# Patient Record
Sex: MALE | Race: WHITE | Employment: UNEMPLOYED | URBAN - METROPOLITAN AREA
[De-identification: names, ages, dates, MRNs, and addresses within clinical notes are randomized per-mention and may not be internally consistent; named-entity substitution may affect disease eponyms.]

---

## 2020-05-01 ENCOUNTER — APPOINTMENT (OUTPATIENT)
Dept: GENERAL RADIOLOGY | Age: 25
End: 2020-05-01
Payer: MEDICAID

## 2020-05-01 ENCOUNTER — HOSPITAL ENCOUNTER (EMERGENCY)
Age: 25
Discharge: HOME | End: 2020-05-01
Payer: MEDICAID

## 2020-05-01 VITALS
WEIGHT: 275 LBS | SYSTOLIC BLOOD PRESSURE: 134 MMHG | HEART RATE: 98 BPM | TEMPERATURE: 99.7 F | DIASTOLIC BLOOD PRESSURE: 88 MMHG | BODY MASS INDEX: 38.5 KG/M2 | HEIGHT: 71 IN | RESPIRATION RATE: 20 BRPM | OXYGEN SATURATION: 99 %

## 2020-05-01 LAB
A/G RATIO: 1.1 (ref 1.1–2.2)
ALBUMIN SERPL-MCNC: 4.4 G/DL (ref 3.4–5)
ALP BLD-CCNC: 38 U/L (ref 40–129)
ALT SERPL-CCNC: 92 U/L (ref 10–40)
ANION GAP SERPL CALCULATED.3IONS-SCNC: 18 MMOL/L (ref 3–16)
AST SERPL-CCNC: 44 U/L (ref 15–37)
BASOPHILS ABSOLUTE: 0.1 K/UL (ref 0–0.2)
BASOPHILS RELATIVE PERCENT: 0.8 %
BILIRUB SERPL-MCNC: 0.3 MG/DL (ref 0–1)
BILIRUBIN URINE: NEGATIVE
BLOOD, URINE: NEGATIVE
BUN BLDV-MCNC: 9 MG/DL (ref 7–20)
CALCIUM SERPL-MCNC: 9.7 MG/DL (ref 8.3–10.6)
CHLORIDE BLD-SCNC: 100 MMOL/L (ref 99–110)
CLARITY: CLEAR
CO2: 22 MMOL/L (ref 21–32)
COLOR: YELLOW
CREAT SERPL-MCNC: 0.7 MG/DL (ref 0.9–1.3)
EOSINOPHILS ABSOLUTE: 0.2 K/UL (ref 0–0.6)
EOSINOPHILS RELATIVE PERCENT: 1.3 %
GFR AFRICAN AMERICAN: >60
GFR NON-AFRICAN AMERICAN: >60
GLOBULIN: 4.1 G/DL
GLUCOSE BLD-MCNC: 111 MG/DL (ref 70–99)
GLUCOSE URINE: NEGATIVE MG/DL
HCT VFR BLD CALC: 46.4 % (ref 40.5–52.5)
HEMOGLOBIN: 15.5 G/DL (ref 13.5–17.5)
KETONES, URINE: NEGATIVE MG/DL
LEUKOCYTE ESTERASE, URINE: NEGATIVE
LYMPHOCYTES ABSOLUTE: 2 K/UL (ref 1–5.1)
LYMPHOCYTES RELATIVE PERCENT: 12.7 %
MCH RBC QN AUTO: 28.7 PG (ref 26–34)
MCHC RBC AUTO-ENTMCNC: 33.5 G/DL (ref 31–36)
MCV RBC AUTO: 85.6 FL (ref 80–100)
MICROSCOPIC EXAMINATION: NORMAL
MONOCYTES ABSOLUTE: 1 K/UL (ref 0–1.3)
MONOCYTES RELATIVE PERCENT: 6.2 %
NEUTROPHILS ABSOLUTE: 12.4 K/UL (ref 1.7–7.7)
NEUTROPHILS RELATIVE PERCENT: 79 %
NITRITE, URINE: NEGATIVE
PDW BLD-RTO: 14.8 % (ref 12.4–15.4)
PH UA: 6 (ref 5–8)
PLATELET # BLD: 345 K/UL (ref 135–450)
PMV BLD AUTO: 7.8 FL (ref 5–10.5)
POTASSIUM SERPL-SCNC: 3.9 MMOL/L (ref 3.5–5.1)
PROTEIN UA: NEGATIVE MG/DL
RBC # BLD: 5.42 M/UL (ref 4.2–5.9)
SODIUM BLD-SCNC: 140 MMOL/L (ref 136–145)
SPECIFIC GRAVITY UA: <=1.005 (ref 1–1.03)
TOTAL PROTEIN: 8.5 G/DL (ref 6.4–8.2)
URINE TYPE: NORMAL
UROBILINOGEN, URINE: 0.2 E.U./DL
WBC # BLD: 15.8 K/UL (ref 4–11)

## 2020-05-01 PROCEDURE — 85025 COMPLETE CBC W/AUTO DIFF WBC: CPT

## 2020-05-01 PROCEDURE — 71046 X-RAY EXAM CHEST 2 VIEWS: CPT

## 2020-05-01 PROCEDURE — 99284 EMERGENCY DEPT VISIT MOD MDM: CPT

## 2020-05-01 PROCEDURE — 80053 COMPREHEN METABOLIC PANEL: CPT

## 2020-05-01 PROCEDURE — 36415 COLL VENOUS BLD VENIPUNCTURE: CPT

## 2020-05-01 PROCEDURE — 81003 URINALYSIS AUTO W/O SCOPE: CPT

## 2020-05-01 RX ORDER — ONDANSETRON 4 MG/1
4 TABLET, FILM COATED ORAL EVERY 8 HOURS PRN
Qty: 20 TABLET | Refills: 0 | Status: SHIPPED | OUTPATIENT
Start: 2020-05-01 | End: 2020-08-28

## 2020-05-01 RX ORDER — MECLIZINE HYDROCHLORIDE 25 MG/1
25 TABLET ORAL 3 TIMES DAILY PRN
Qty: 15 TABLET | Refills: 0 | Status: SHIPPED | OUTPATIENT
Start: 2020-05-01 | End: 2020-05-11

## 2020-05-01 NOTE — ED NOTES
Orthostatic VS as follows. Lying BP: 125/74, P 108, R 18. Sitting /81, P 103, R 18. Standing /80, P 97, R 20. Pt states + dizziness during each posture change.  Pt to CT via [facility] without incident or c/o's     Galdino Drake RN  05/01/20 5620

## 2020-08-19 ENCOUNTER — HOSPITAL ENCOUNTER (OUTPATIENT)
Dept: ULTRASOUND IMAGING | Age: 25
Discharge: HOME | End: 2020-08-19
Payer: COMMERCIAL

## 2020-08-19 PROCEDURE — 76705 ECHO EXAM OF ABDOMEN: CPT

## 2020-08-28 ENCOUNTER — APPOINTMENT (OUTPATIENT)
Dept: GENERAL RADIOLOGY | Age: 25
End: 2020-08-28
Payer: COMMERCIAL

## 2020-08-28 ENCOUNTER — HOSPITAL ENCOUNTER (EMERGENCY)
Age: 25
Discharge: HOME | End: 2020-08-28
Payer: COMMERCIAL

## 2020-08-28 VITALS
TEMPERATURE: 97.6 F | SYSTOLIC BLOOD PRESSURE: 135 MMHG | DIASTOLIC BLOOD PRESSURE: 78 MMHG | RESPIRATION RATE: 16 BRPM | HEART RATE: 77 BPM | OXYGEN SATURATION: 99 %

## 2020-08-28 LAB
A/G RATIO: 1.1 (ref 1.1–2.2)
ALBUMIN SERPL-MCNC: 4.6 G/DL (ref 3.4–5)
ALP BLD-CCNC: 38 U/L (ref 40–129)
ALT SERPL-CCNC: 98 U/L (ref 10–40)
ANION GAP SERPL CALCULATED.3IONS-SCNC: 12 MMOL/L (ref 3–16)
AST SERPL-CCNC: 34 U/L (ref 15–37)
BASOPHILS ABSOLUTE: 0.1 K/UL (ref 0–0.2)
BASOPHILS RELATIVE PERCENT: 0.5 %
BILIRUB SERPL-MCNC: 0.7 MG/DL (ref 0–1)
BUN BLDV-MCNC: 9 MG/DL (ref 7–20)
CALCIUM SERPL-MCNC: 9.9 MG/DL (ref 8.3–10.6)
CHLORIDE BLD-SCNC: 98 MMOL/L (ref 99–110)
CO2: 24 MMOL/L (ref 21–32)
CREAT SERPL-MCNC: 0.7 MG/DL (ref 0.9–1.3)
D DIMER: 218 NG/ML DDU (ref 0–229)
EKG ATRIAL RATE: 76 BPM
EKG DIAGNOSIS: NORMAL
EKG P AXIS: 43 DEGREES
EKG P-R INTERVAL: 138 MS
EKG Q-T INTERVAL: 392 MS
EKG QRS DURATION: 102 MS
EKG QTC CALCULATION (BAZETT): 441 MS
EKG R AXIS: 22 DEGREES
EKG T AXIS: 61 DEGREES
EKG VENTRICULAR RATE: 76 BPM
EOSINOPHILS ABSOLUTE: 0.2 K/UL (ref 0–0.6)
EOSINOPHILS RELATIVE PERCENT: 1.2 %
GFR AFRICAN AMERICAN: >60
GFR NON-AFRICAN AMERICAN: >60
GLOBULIN: 4.1 G/DL
GLUCOSE BLD-MCNC: 97 MG/DL (ref 70–99)
HCT VFR BLD CALC: 47.1 % (ref 40.5–52.5)
HEMOGLOBIN: 15.7 G/DL (ref 13.5–17.5)
LYMPHOCYTES ABSOLUTE: 3.4 K/UL (ref 1–5.1)
LYMPHOCYTES RELATIVE PERCENT: 21.1 %
MCH RBC QN AUTO: 28.7 PG (ref 26–34)
MCHC RBC AUTO-ENTMCNC: 33.4 G/DL (ref 31–36)
MCV RBC AUTO: 85.8 FL (ref 80–100)
MONOCYTES ABSOLUTE: 1.2 K/UL (ref 0–1.3)
MONOCYTES RELATIVE PERCENT: 7.3 %
NEUTROPHILS ABSOLUTE: 11.4 K/UL (ref 1.7–7.7)
NEUTROPHILS RELATIVE PERCENT: 69.9 %
PDW BLD-RTO: 15 % (ref 12.4–15.4)
PLATELET # BLD: 336 K/UL (ref 135–450)
PMV BLD AUTO: 8.1 FL (ref 5–10.5)
POTASSIUM REFLEX MAGNESIUM: 3.7 MMOL/L (ref 3.5–5.1)
PRO-BNP: 10 PG/ML (ref 0–124)
RBC # BLD: 5.49 M/UL (ref 4.2–5.9)
SODIUM BLD-SCNC: 134 MMOL/L (ref 136–145)
TOTAL PROTEIN: 8.7 G/DL (ref 6.4–8.2)
TROPONIN: <0.01 NG/ML
TROPONIN: <0.01 NG/ML
WBC # BLD: 16.3 K/UL (ref 4–11)

## 2020-08-28 PROCEDURE — 80053 COMPREHEN METABOLIC PANEL: CPT

## 2020-08-28 PROCEDURE — 6360000002 HC RX W HCPCS: Performed by: PHYSICIAN ASSISTANT

## 2020-08-28 PROCEDURE — 93010 ELECTROCARDIOGRAM REPORT: CPT | Performed by: INTERNAL MEDICINE

## 2020-08-28 PROCEDURE — 83880 ASSAY OF NATRIURETIC PEPTIDE: CPT

## 2020-08-28 PROCEDURE — 6370000000 HC RX 637 (ALT 250 FOR IP): Performed by: PHYSICIAN ASSISTANT

## 2020-08-28 PROCEDURE — 85379 FIBRIN DEGRADATION QUANT: CPT

## 2020-08-28 PROCEDURE — 71046 X-RAY EXAM CHEST 2 VIEWS: CPT

## 2020-08-28 PROCEDURE — 96374 THER/PROPH/DIAG INJ IV PUSH: CPT

## 2020-08-28 PROCEDURE — 99285 EMERGENCY DEPT VISIT HI MDM: CPT

## 2020-08-28 PROCEDURE — U0003 INFECTIOUS AGENT DETECTION BY NUCLEIC ACID (DNA OR RNA); SEVERE ACUTE RESPIRATORY SYNDROME CORONAVIRUS 2 (SARS-COV-2) (CORONAVIRUS DISEASE [COVID-19]), AMPLIFIED PROBE TECHNIQUE, MAKING USE OF HIGH THROUGHPUT TECHNOLOGIES AS DESCRIBED BY CMS-2020-01-R: HCPCS

## 2020-08-28 PROCEDURE — 84484 ASSAY OF TROPONIN QUANT: CPT

## 2020-08-28 PROCEDURE — 85025 COMPLETE CBC W/AUTO DIFF WBC: CPT

## 2020-08-28 PROCEDURE — 93005 ELECTROCARDIOGRAM TRACING: CPT | Performed by: EMERGENCY MEDICINE

## 2020-08-28 PROCEDURE — 2580000003 HC RX 258: Performed by: PHYSICIAN ASSISTANT

## 2020-08-28 RX ORDER — ONDANSETRON 2 MG/ML
4 INJECTION INTRAMUSCULAR; INTRAVENOUS ONCE
Status: COMPLETED | OUTPATIENT
Start: 2020-08-28 | End: 2020-08-28

## 2020-08-28 RX ORDER — MECLIZINE HCL 12.5 MG/1
12.5 TABLET ORAL 3 TIMES DAILY PRN
COMMUNITY
End: 2020-10-15 | Stop reason: ALTCHOICE

## 2020-08-28 RX ORDER — ASPIRIN 325 MG
325 TABLET ORAL ONCE
Status: COMPLETED | OUTPATIENT
Start: 2020-08-28 | End: 2020-08-28

## 2020-08-28 RX ORDER — 0.9 % SODIUM CHLORIDE 0.9 %
1000 INTRAVENOUS SOLUTION INTRAVENOUS ONCE
Status: COMPLETED | OUTPATIENT
Start: 2020-08-28 | End: 2020-08-28

## 2020-08-28 RX ADMIN — SODIUM CHLORIDE 1000 ML: 9 INJECTION, SOLUTION INTRAVENOUS at 18:18

## 2020-08-28 RX ADMIN — ONDANSETRON HYDROCHLORIDE 4 MG: 2 INJECTION, SOLUTION INTRAMUSCULAR; INTRAVENOUS at 18:18

## 2020-08-28 RX ADMIN — ASPIRIN 325 MG: 325 TABLET, FILM COATED ORAL at 18:18

## 2020-08-28 ASSESSMENT — HEART SCORE: ECG: 0

## 2020-08-28 ASSESSMENT — PAIN SCALES - GENERAL: PAINLEVEL_OUTOF10: 8

## 2020-08-29 LAB — SARS-COV-2, PCR: NOT DETECTED

## 2020-08-31 ASSESSMENT — ENCOUNTER SYMPTOMS
EYE REDNESS: 0
SHORTNESS OF BREATH: 0
DIARRHEA: 0
CHEST TIGHTNESS: 0
NAUSEA: 0
SINUS PRESSURE: 0
EYE DISCHARGE: 0
CONSTIPATION: 0
COUGH: 0
VOMITING: 0
ABDOMINAL PAIN: 0
SORE THROAT: 0
RHINORRHEA: 0
SINUS PAIN: 0

## 2020-08-31 NOTE — ED PROVIDER NOTES
[address] ED  EMERGENCY DEPARTMENT ENCOUNTER        Pt Name: Isabella Moreno  MRN: [id number]  Armsmarquisgfshayna 1995  Date of evaluation: 8/28/2020  Provider: Brad Galindo PA-C  PCP: SHADIA Davila CNP  ED Attending: No att. providers found      This patient was not seen and evaluated by the attending physician No att. providers found. I have independently evaluated this patient. CHIEF COMPLAINT       Chief Complaint   Patient presents with    Chest Pain       HISTORY OF PRESENT ILLNESS   (Location/Symptom, Timing/Onset, Context/Setting, Quality, Duration, Modifying Factors, Severity)  Note limiting factors. Isabella Sender is a 31 y.o. male for a ration of a 2-week history of chest pain left of midline. Patient advised he feels like he is getting pinched under his left breast.  He denies any shortness of breath or radiation denies any coughing. Denies any cardiac history. Patient is a vapor. Gradual onset of worsening symptoms which have been intermittent since the initial onset 8 out of 10 pain. Nursing Notes were all reviewed and agreed with or any disagreements were addressed  in the HPI. REVIEW OF SYSTEMS  (2-9 systems for level 4, 10 or more for level 5)     Review of Systems   Constitutional: Negative for chills and fever. HENT: Negative. Negative for congestion, rhinorrhea, sinus pressure, sinus pain and sore throat. Eyes: Negative for discharge, redness and visual disturbance. Respiratory: Negative for cough, chest tightness and shortness of breath. Cardiovascular: Positive for chest pain. Negative for palpitations. Gastrointestinal: Negative for abdominal pain, constipation, diarrhea, nausea and vomiting. Genitourinary: Negative for difficulty urinating, dysuria and frequency. Musculoskeletal: Negative. Skin: Negative. Neurological: Negative. Negative for dizziness, weakness, numbness and headaches.    Psychiatric/Behavioral: Negative. All other systems reviewed and are negative. Positivesand Pertinent negatives as per HPI. Except as noted above in the ROS, all other systems were reviewed and negative. PAST MEDICAL HISTORY     Past Medical History:   Diagnosis Date    Anxiety     Dizziness          SURGICAL HISTORY     History reviewed. No pertinent surgical history. CURRENT MEDICATIONS       Discharge Medication List as of 8/28/2020  8:57 PM      CONTINUE these medications which have NOT CHANGED    Details   meclizine (ANTIVERT) 12.5 MG tablet Take 12.5 mg by mouth 3 times daily as neededHistorical Med               ALLERGIES     Amoxicillin    FAMILY HISTORY     History reviewed. No pertinent family history.       SOCIAL HISTORY       Social History     Socioeconomic History    Marital status: Single     Spouse name: None    Number of children: None    Years of education: None    Highest education level: None   Occupational History    None   Social Needs    Financial resource strain: None    Food insecurity     Worry: None     Inability: None    Transportation needs     Medical: None     Non-medical: None   Tobacco Use    Smoking status: Former Smoker     Packs/day: 0.25     Years: 3.00     Pack years: 0.75     Types: Cigarettes    Smokeless tobacco: Current User     Types: Snuff   Substance and Sexual Activity    Alcohol use: No    Drug use: No    Sexual activity: Yes     Partners: Female   Lifestyle    Physical activity     Days per week: None     Minutes per session: None    Stress: None   Relationships    Social connections     Talks on phone: None     Gets together: None     Attends [employer] service: None     Active member of club or organization: None     Attends meetings of clubs or organizations: None     Relationship status: None    Intimate partner violence     Fear of current or ex partner: None     Emotionally abused: None     Physically abused: None     Forced sexual activity: None Other Topics Concern    None   Social History Narrative    None       SCREENINGS     NIH Score       Glascow Duncan Coma Scale  Eye Opening: Spontaneous  Best Verbal Response: Oriented  Best Motor Response: Obeys commands  Angus Coma Scale Score: 15    [facility]     Heart Score  Heart Score for chest pain patients  History: Slightly Suspicious  ECG: Normal  Patient Age: < 45 years  Risk Factors: No risk factors known  Troponin: < 1X normal limit  Heart Score Total: 0      PHYSICAL EXAM    (up to 7 for level 4, 8 ormore for level 5)     ED Triage Vitals [08/28/20 1711]   BP Temp Temp src Pulse Resp SpO2 Height Weight   127/85 97.6 °F (36.4 °C) -- 81 20 99 % -- --       Physical Exam  Vitals signs and nursing note reviewed. Constitutional:       Appearance: He is well-developed. He is obese. HENT:      Head: Normocephalic and atraumatic. Eyes:      General:         Right eye: No discharge. Left eye: No discharge. Neck:      Musculoskeletal: Normal range of motion and neck supple. Cardiovascular:      Rate and Rhythm: Normal rate and regular rhythm. Pulmonary:      Effort: Pulmonary effort is normal. No respiratory distress. Musculoskeletal: Normal range of motion. Skin:     General: Skin is warm and dry. Coloration: Skin is not pale. Neurological:      Mental Status: He is alert and oriented to person, place, and time.    Psychiatric:         Behavior: Behavior normal.         DIAGNOSTIC RESULTS   LABS:    Labs Reviewed   CBC WITH AUTO DIFFERENTIAL - Abnormal; Notable for the following components:       Result Value    WBC 16.3 (*)     Neutrophils Absolute 11.4 (*)     All other components within normal limits    Narrative:     Performed at:  [facility]) - [address] [address],  [place], [facility]   Phone [phone]   COMPREHENSIVE METABOLIC PANEL W/ REFLEX TO MG FOR LOW K - Abnormal; Notable for the following components:    Sodium 134 (*) Chloride 98 (*)     CREATININE 0.7 (*)     Total Protein 8.7 (*)     Alkaline Phosphatase 38 (*)     ALT 98 (*)     All other components within normal limits    Narrative:     Performed at:  [facility] [address],  [place], [facility]   Phone [phone]   TROPONIN    Narrative:     Performed at:  [address] [address],  [place], [facility]   Phone [phone]   D-DIMER, QUANTITATIVE    Narrative:     Performed at:  [address] [place] [address],  [place], [facility]   Phone [phone]   BRAIN NATRIURETIC PEPTIDE    Narrative:     Performed at:  [address] [address],  [place], [facility]   Phone [phone]   TROPONIN    Narrative:     Performed at:  [address] [place] [address],  [place], [facility]   Phone [phone]    Narrative:     Performed at:  [facility]  [address] [place] [address]   Phone [phone]       All other labs were within normal range or notreturned as of this dictation. EKG: All EKG's are interpreted by the Emergency Department Physician who either signs or Co-signs this chart in the absence of a cardiologist.  Please see their note for interpretation of EKG. RADIOLOGY:         Interpretation per the Radiologist below, if available at the time of this note:    XR CHEST (2 VW)   Final Result   1. No acute abnormality. Xr Chest (2 Vw)    Result Date: 8/28/2020  EXAMINATION: TWO XRAY VIEWS OF THE CHEST 8/28/2020 5:43 pm COMPARISON: 05/01/2020 HISTORY: ORDERING SYSTEM PROVIDED HISTORY: chest pain TECHNOLOGIST PROVIDED HISTORY: Reason for exam:->chest pain Reason for Exam: chest pain Acuity: Acute Type of Exam: Initial FINDINGS: The lungs are clear.   The cardiac silhouette is within normal limits. There is no pneumothorax or pleural effusion. 1.  No acute abnormality. PROCEDURES   Unless otherwise noted below, none     Procedures    CRITICAL CARE TIME   N/A    CONSULTS:  None      EMERGENCY DEPARTMENT COURSE and DIFFERENTIAL DIAGNOSIS/MDM:   Vitals:    Vitals:    08/28/20 1711 08/28/20 1939 08/28/20 2040   BP: 127/85  135/78   Pulse: 81  77   Resp: 20 16 16   Temp: 97.6 °F (36.4 °C)     SpO2: 99% 100% 99%       Patient was given the following medications:  Medications   aspirin tablet 325 mg (325 mg Oral Given 8/28/20 1818)   ondansetron (ZOFRAN) injection 4 mg (4 mg Intravenous Given 8/28/20 1818)   0.9 % sodium chloride bolus (0 mLs Intravenous Stopped 8/28/20 1939)         Afebrile, stable, patient presents to the ED for evaluation. Nontoxic patient in no acute distress reports a pinching sensation under his left breast.  Unable to reproduce the pain on physical exam there is no evidence of a rash serial troponins are evaluated in addition to an EKG chest x-ray is evaluated. No acute findings no indication for admission. Patient is requesting a referral to cardiology heart score of 0. All questions are answered. Patient is provided with Pepcid Zofran aspirin and IV fluids which helps his symptoms on reevaluation he denies having any pain at this time. Indications for return to the ED are discussed. Patient is advised if any new or worsening symptoms arise they should immediately return to the emergency room. Follow-up with primary care in 1-2 days. The patient tolerated their visit well. The patient and / or the family were informed of the results of any tests, a time was given to answer questions, a plan was proposed and they agreed Melinda Mcfadden.       Results for orders placed or performed during the hospital encounter of 08/28/20   CBC auto differential   Result Value Ref Range    WBC 16.3 (H) 4.0 - 11.0 K/uL    RBC 5.49 4.20 - 5.90 M/uL    Hemoglobin 15.7 13.5 - 17.5 g/dL    Hematocrit 47.1 40.5 - 52.5 %    MCV 85.8 80.0 - 100.0 fL    MCH 28.7 26.0 - 34.0 pg    MCHC 33.4 31.0 - 36.0 g/dL    RDW 15.0 12.4 - 15.4 %    Platelets 420 884 - 223 K/uL    MPV 8.1 5.0 - 10.5 fL    Neutrophils % 69.9 %    Lymphocytes % 21.1 %    Monocytes % 7.3 %    Eosinophils % 1.2 %    Basophils % 0.5 %    Neutrophils Absolute 11.4 (H) 1.7 - 7.7 K/uL    Lymphocytes Absolute 3.4 1.0 - 5.1 K/uL    Monocytes Absolute 1.2 0.0 - 1.3 K/uL    Eosinophils Absolute 0.2 0.0 - 0.6 K/uL    Basophils Absolute 0.1 0.0 - 0.2 K/uL   Comprehensive Metabolic Panel w/ Reflex to MG   Result Value Ref Range    Sodium 134 (L) 136 - 145 mmol/L    Potassium reflex Magnesium 3.7 3.5 - 5.1 mmol/L    Chloride 98 (L) 99 - 110 mmol/L    CO2 24 21 - 32 mmol/L    Anion Gap 12 3 - 16    Glucose 97 70 - 99 mg/dL    BUN 9 7 - 20 mg/dL    CREATININE 0.7 (L) 0.9 - 1.3 mg/dL    GFR Non-African American >60 >60    GFR African American >60 >60    Calcium 9.9 8.3 - 10.6 mg/dL    Total Protein 8.7 (H) 6.4 - 8.2 g/dL    Alb 4.6 3.4 - 5.0 g/dL    Albumin/Globulin Ratio 1.1 1.1 - 2.2    Total Bilirubin 0.7 0.0 - 1.0 mg/dL    Alkaline Phosphatase 38 (L) 40 - 129 U/L    ALT 98 (H) 10 - 40 U/L    AST 34 15 - 37 U/L    Globulin 4.1 g/dL   Troponin   Result Value Ref Range    Troponin <0.01 <0.01 ng/mL   D-Dimer, Quantitative   Result Value Ref Range    D-Dimer, Quant 218 0 - 229 ng/mL DDU   Brain Natriuretic Peptide   Result Value Ref Range    Pro-BNP 10 0 - 124 pg/mL   Troponin   Result Value Ref Range    Troponin <0.01 <0.01 ng/mL   COVID-19   Result Value Ref Range    SARS-CoV-2, PCR Not Detected Not Detected   EKG 12 Lead   Result Value Ref Range    Ventricular Rate 76 BPM    Atrial Rate 76 BPM    P-R Interval 138 ms    QRS Duration 102 ms    Q-T Interval 392 ms    QTc Calculation (Bazett) 441 ms    P Axis 43 degrees    R Axis 22 degrees    T Axis 61 degrees    Diagnosis       Normal sinus rhythmIncomplete right bundle branch blockAbnormal [address] [facility]

## 2020-10-15 ENCOUNTER — HOSPITAL ENCOUNTER (EMERGENCY)
Age: 25
Discharge: HOME | End: 2020-10-15
Attending: EMERGENCY MEDICINE
Payer: COMMERCIAL

## 2020-10-15 VITALS
TEMPERATURE: 98.2 F | RESPIRATION RATE: 18 BRPM | HEART RATE: 108 BPM | OXYGEN SATURATION: 97 % | SYSTOLIC BLOOD PRESSURE: 162 MMHG | DIASTOLIC BLOOD PRESSURE: 90 MMHG

## 2020-10-15 LAB
A/G RATIO: 1.3 (ref 1.1–2.2)
ACETAMINOPHEN LEVEL: <5 UG/ML (ref 10–30)
ALBUMIN SERPL-MCNC: 4.4 G/DL (ref 3.4–5)
ALP BLD-CCNC: 37 U/L (ref 40–129)
ALT SERPL-CCNC: 127 U/L (ref 10–40)
AMPHETAMINE SCREEN, URINE: NORMAL
ANION GAP SERPL CALCULATED.3IONS-SCNC: 8 MMOL/L (ref 3–16)
AST SERPL-CCNC: 59 U/L (ref 15–37)
BARBITURATE SCREEN URINE: NORMAL
BASOPHILS ABSOLUTE: 0.1 K/UL (ref 0–0.2)
BASOPHILS RELATIVE PERCENT: 1.1 %
BENZODIAZEPINE SCREEN, URINE: NORMAL
BILIRUB SERPL-MCNC: 0.4 MG/DL (ref 0–1)
BILIRUBIN URINE: NEGATIVE
BLOOD, URINE: NEGATIVE
BUN BLDV-MCNC: 9 MG/DL (ref 7–20)
CALCIUM SERPL-MCNC: 9.7 MG/DL (ref 8.3–10.6)
CANNABINOID SCREEN URINE: NORMAL
CHLORIDE BLD-SCNC: 103 MMOL/L (ref 99–110)
CLARITY: CLEAR
CO2: 23 MMOL/L (ref 21–32)
COCAINE METABOLITE SCREEN URINE: NORMAL
COLOR: YELLOW
CREAT SERPL-MCNC: 0.7 MG/DL (ref 0.9–1.3)
EOSINOPHILS ABSOLUTE: 0.2 K/UL (ref 0–0.6)
EOSINOPHILS RELATIVE PERCENT: 1.7 %
ETHANOL: NORMAL MG/DL (ref 0–0.08)
GFR AFRICAN AMERICAN: >60
GFR NON-AFRICAN AMERICAN: >60
GLOBULIN: 3.3 G/DL
GLUCOSE BLD-MCNC: 99 MG/DL (ref 70–99)
GLUCOSE URINE: NEGATIVE MG/DL
HCT VFR BLD CALC: 44.7 % (ref 40.5–52.5)
HEMOGLOBIN: 15.3 G/DL (ref 13.5–17.5)
KETONES, URINE: NEGATIVE MG/DL
LEUKOCYTE ESTERASE, URINE: NEGATIVE
LYMPHOCYTES ABSOLUTE: 2 K/UL (ref 1–5.1)
LYMPHOCYTES RELATIVE PERCENT: 15.5 %
Lab: NORMAL
MCH RBC QN AUTO: 29.4 PG (ref 26–34)
MCHC RBC AUTO-ENTMCNC: 34.3 G/DL (ref 31–36)
MCV RBC AUTO: 85.7 FL (ref 80–100)
METHADONE SCREEN, URINE: NORMAL
MICROSCOPIC EXAMINATION: NORMAL
MONOCYTES ABSOLUTE: 1 K/UL (ref 0–1.3)
MONOCYTES RELATIVE PERCENT: 7.4 %
NEUTROPHILS ABSOLUTE: 9.6 K/UL (ref 1.7–7.7)
NEUTROPHILS RELATIVE PERCENT: 74.3 %
NITRITE, URINE: NEGATIVE
OPIATE SCREEN URINE: NORMAL
OXYCODONE URINE: NORMAL
PDW BLD-RTO: 14.7 % (ref 12.4–15.4)
PH UA: 6.5
PH UA: 6.5 (ref 5–8)
PHENCYCLIDINE SCREEN URINE: NORMAL
PLATELET # BLD: 308 K/UL (ref 135–450)
PMV BLD AUTO: 8.4 FL (ref 5–10.5)
POTASSIUM REFLEX MAGNESIUM: 3.8 MMOL/L (ref 3.5–5.1)
PROPOXYPHENE SCREEN: NORMAL
PROTEIN UA: NEGATIVE MG/DL
RBC # BLD: 5.22 M/UL (ref 4.2–5.9)
SALICYLATE, SERUM: <0.3 MG/DL (ref 15–30)
SODIUM BLD-SCNC: 134 MMOL/L (ref 136–145)
SPECIFIC GRAVITY UA: 1.01 (ref 1–1.03)
TOTAL PROTEIN: 7.7 G/DL (ref 6.4–8.2)
URINE REFLEX TO CULTURE: NORMAL
URINE TYPE: NORMAL
UROBILINOGEN, URINE: 0.2 E.U./DL
WBC # BLD: 12.9 K/UL (ref 4–11)

## 2020-10-15 PROCEDURE — G0480 DRUG TEST DEF 1-7 CLASSES: HCPCS

## 2020-10-15 PROCEDURE — 81003 URINALYSIS AUTO W/O SCOPE: CPT

## 2020-10-15 PROCEDURE — 99284 EMERGENCY DEPT VISIT MOD MDM: CPT

## 2020-10-15 PROCEDURE — 80307 DRUG TEST PRSMV CHEM ANLYZR: CPT

## 2020-10-15 PROCEDURE — 85025 COMPLETE CBC W/AUTO DIFF WBC: CPT

## 2020-10-15 PROCEDURE — 80053 COMPREHEN METABOLIC PANEL: CPT

## 2020-10-15 PROCEDURE — 93005 ELECTROCARDIOGRAM TRACING: CPT | Performed by: EMERGENCY MEDICINE

## 2020-10-15 ASSESSMENT — ENCOUNTER SYMPTOMS
NAUSEA: 0
ABDOMINAL PAIN: 0
VOMITING: 0
SORE THROAT: 0
BACK PAIN: 0
COUGH: 0
SHORTNESS OF BREATH: 0

## 2020-10-16 LAB
EKG ATRIAL RATE: 90 BPM
EKG DIAGNOSIS: NORMAL
EKG P AXIS: 48 DEGREES
EKG P-R INTERVAL: 134 MS
EKG Q-T INTERVAL: 350 MS
EKG QRS DURATION: 98 MS
EKG QTC CALCULATION (BAZETT): 428 MS
EKG R AXIS: 12 DEGREES
EKG T AXIS: 59 DEGREES
EKG VENTRICULAR RATE: 90 BPM

## 2020-10-16 PROCEDURE — 93010 ELECTROCARDIOGRAM REPORT: CPT | Performed by: INTERNAL MEDICINE

## 2020-10-16 NOTE — ED TRIAGE NOTES
Presenting Problem:   31year old male brought into the ED by his mother for evaluation of suicidal thoughts with worsening depression and anxiety. Patient reported that he has been having issues with depression and anxiety since the sudden death of his grandfather 5 years ago. Patient's grandfather  of a massive heart attack in front of the pt and feels he is somewhat responsible for his death. Patient reported that he \"doesn't feel anything, not happy, not sad\". Patient also reported that he is isolative, has decreased energy, difficulty sleeping, social anxiety-unable to be in larger crowds, episodes of tearfulness, fleeting/infrequent suicidal thoughts but never had a plan or tried to harm himself. Patient reported that today he started feeling hopeless and told his mother that \"the doctors can't help me\", he was crying and unable to calm down. Mother reported that she brought him to the ED for evaluation and to show him that there was \"help out there\". Appearance/Hygiene:  well-appearing, street clothes, good grooming and good hygiene   Motor Behavior: WNL   Attitude: quiet, cooperative,   Affect: anxious  Speech: soft  Mood: anxious, depressed, dysthymic, loss of pleasure and sad   Thought Processes: Neola  Perceptions: Absent   Thought content: lack of goals, perseverating  Suicidal ideation:  no specific plan to harm self   Homicidal ideation:  none  Orientation: A&Ox4   Memory: intact  Concentration: Fair    Insight/ judgement: impaired insight, impaired judgement      Psychosocial and contextual factors:     C-SSRS Summary (including current and past suicidal ideation, plan, intent, and attempts) : Denies current. Reported he has fleeting/infrequent suicidal thoughts but has never had a plan or tried to harm himself.  Denied having suicidal thoughts at this time    Psychiatric History: None    Patient reported diagnosis: Has had issues with anxiety and depression since the death of his grandfather 5 years ago    Outpatient services/ Provider: None    Previous Inpatient Admissions( including location and dates if known): None    Self-injurious/ Self-harm behavior: Denied    History of violence: Denied    Current Substance use: Denied    Trauma identified: Denied    Access to Firearms: Denied, Mother locked up guns    ASSESSMENT FOR IMMINENT FUTURE DANGER:      RISK FACTORS:    []  Age <25 or >55   [x]  Male gender   [x]  Depressed mood   []  Active suicidal ideation   []  Suicide plan   []  Suicide attempt   [x]  Access to lethal means-mother to lock up guns   []  Prior suicide attempt   []  Active substance abuse   []  Highly impulsive behaviors   []  Not attending to self-care/ADLs    []  Recent significant loss   []  Chronic pain or medical illness   [x]  Social isolation   []  History of violence   []  Active psychosis   []  Cognitive impairment    [x]  No outpatient services in place   []  Medication noncompliance   []  No collateral information to support safety      PROTECTIVE FACTORS:  [x] Age >25 and <55  [] Female gender   [] Denies depression  [x] Denies suicidal ideation  [x] Does not have lethal plan   [] Does not have access to guns or weapons  [x] Patient is verbally jonathan for safety  [x] No prior suicide attempts  [x] No active substance abuse  [x] Patient has social or family support  [] No active psychosis or cognitive dysfunction  [] Physically healthy  [] Has outpatient services in place  [] Compliant with recommended medications  [] Collateral information from Mother supports patient safety         Clinical Summary:    Patient presents to ED voluntarily for SI with worsening depression. Patient was clinically sober at the time of the evaluation. Patient was evaluated and offered supportive counseling. Collateral information was gathered by ADARSH Pineda from pt's mother.

## 2020-10-16 NOTE — ED TRIAGE NOTES
Patient states he has been having problems with depression for 5 years. Nothing seems to really make it better or worse. Mom states that his grandfather did pass 5 years ago tomorrow and that sometimes things get worse around that time. Patient states he would think about sleeping and not waking up but denies having any plans or idea on how to hurt self. Patient denies alcohol or drugs. Hx of anxiety with car by PCP, PCP suggested patient go to therapy but patient has not. Denies SI/HI at this current time but states he is depressed. NAD noted at this time.

## 2020-10-16 NOTE — ED TRIAGE NOTES
Collateral Contact:  Name: Real Sanchez  Relation to Patient: Mother  Last Contact with Patient: with pt now  Concerns: Mother voiced concerns regarding patient's worsening depression/anxiety symptoms since the death of his grandfather 5 years ago. Mother was initially on board with patient being admitted for inpatient psychiatric care but then felt that he should not stay and wanted to take the patient home. Advised mother that inpatient care had been recommended by the physician and was in the patient's best interest, mother continued to request patient be sent home with her. Mother did verbalize that she would help patient with following up with recommendations for outpatient therapy.

## 2020-10-16 NOTE — ED PROVIDER NOTES
[address] ED  EMERGENCY DEPARTMENT ENCOUNTER      Pt Name: Andres De Souza  MRN: [id number]  [place] 1995  Date of evaluation: 10/15/2020  Provider: Li Jacobs MD    [address] [place] [address]       Chief Complaint   Patient presents with    Depression         HISTORY OF PRESENT ILLNESS   (Location/Symptom, Timing/Onset, Context/Setting, Quality, Duration, Modifying Factors, Severity)  Note limiting factors. Andres De Souza is a 31 y.o. male who presents to the emergency department     Patient is a 79-year-old male with past medical history of dizziness who presents with depression and suicidal ideations. Patient states that he has been dealing with depression for the last 5 years. This stems from a very traumatic death of his grandfather that he witnessed and was very close with. Tomorrow is the anniversary of his death. Patient has been crying a lot and has been acting very depressed around home. He voiced some suicidal ideations but no definite plan. Denies any drug or alcohol use. The history is provided by the patient. Nursing Notes were reviewed. REVIEW OF SYSTEMS    (2-9 systems for level 4, 10 or more for level 5)     Review of Systems   Constitutional: Negative for chills and fever. HENT: Negative for congestion and sore throat. Eyes: Negative for visual disturbance. Respiratory: Negative for cough and shortness of breath. Cardiovascular: Negative for chest pain. Gastrointestinal: Negative for abdominal pain, nausea and vomiting. Genitourinary: Negative for dysuria and hematuria. Musculoskeletal: Negative for back pain and neck pain. Skin: Negative for pallor and rash. Neurological: Positive for dizziness. Negative for light-headedness and headaches. All other systems reviewed and are negative. Except as noted above the remainder of the review of systems was reviewed and negative.        PAST MEDICAL HISTORY     Past Medical History: Diagnosis Date    Anxiety     Dizziness          SURGICAL HISTORY     History reviewed. No pertinent surgical history. CURRENT MEDICATIONS       Previous Medications    No medications on file       ALLERGIES     Amoxicillin    FAMILY HISTORY     History reviewed. No pertinent family history. SOCIAL HISTORY       Social History     Socioeconomic History    Marital status: Single     Spouse name: None    Number of children: None    Years of education: None    Highest education level: None   Occupational History    None   Social Needs    Financial resource strain: None    Food insecurity     Worry: None     Inability: None    Transportation needs     Medical: None     Non-medical: None   Tobacco Use    Smoking status: Former Smoker     Packs/day: 0.25     Years: 3.00     Pack years: 0.75     Types: Cigarettes    Smokeless tobacco: Current User     Types: Snuff   Substance and Sexual Activity    Alcohol use: No    Drug use: No    Sexual activity: Yes     Partners: Female   Lifestyle    Physical activity     Days per week: None     Minutes per session: None    Stress: None   Relationships    Social connections     Talks on phone: None     Gets together: None     Attends Adventism service: None     Active member of club or organization: None     Attends meetings of clubs or organizations: None     Relationship status: None    Intimate partner violence     Fear of current or ex partner: None     Emotionally abused: None     Physically abused: None     Forced sexual activity: None   Other Topics Concern    None   Social History Narrative    None       SCREENINGS               PHYSICAL EXAM    (up to 7 for level 4, 8 or more for level 5)     ED Triage Vitals   BP Temp Temp src Pulse Resp SpO2 Height Weight   -- -- -- -- -- -- -- --       Physical Exam  Vitals signs and nursing note reviewed. Constitutional:       General: He is not in acute distress. Appearance: Normal appearance. HENT:      Head: Normocephalic and atraumatic. Nose: Nose normal. No congestion. Mouth/Throat:      Mouth: Mucous membranes are moist.   Eyes:      Conjunctiva/sclera: Conjunctivae normal.   Neck:      Musculoskeletal: Normal range of motion and neck supple. Cardiovascular:      Rate and Rhythm: Normal rate and regular rhythm. Pulses: Normal pulses. Heart sounds: Normal heart sounds. No murmur. Pulmonary:      Effort: Pulmonary effort is normal.      Breath sounds: Normal breath sounds. Abdominal:      General: There is no distension. Palpations: Abdomen is soft. Tenderness: There is no abdominal tenderness. Musculoskeletal: Normal range of motion. General: No swelling or deformity. Skin:     General: Skin is warm and dry. Neurological:      General: No focal deficit present. Mental Status: He is alert and oriented to person, place, and time.          DIAGNOSTIC RESULTS     EKG: All EKG's are interpreted by the Emergency Department Physician who either signs or Co-signs this chart in the absence of a cardiologist.    Normal sinus rhythm, rate 90, normal intervals, no STEMI, similar to previous EKG dated August 28, 2020    RADIOLOGY:     Interpretation per the Radiologist below, if available at the time of this note:    No orders to display         LABS:  Labs Reviewed   ACETAMINOPHEN LEVEL - Abnormal; Notable for the following components:       Result Value    Acetaminophen Level <5 (*)     All other components within normal limits    Narrative:     Performed at:  [place] [address],  [place], [facility]   Phone [phone]   CBC WITH AUTO DIFFERENTIAL - Abnormal; Notable for the following components:    WBC 12.9 (*)     Neutrophils Absolute 9.6 (*)     All other components within normal limits    Narrative:     Performed at:  [facility],  [place], [facility] Phone [phone]   COMPREHENSIVE METABOLIC PANEL W/ REFLEX TO MG FOR LOW K - Abnormal; Notable for the following components:    Sodium 134 (*)     CREATININE 0.7 (*)     Alkaline Phosphatase 37 (*)      (*)     AST 59 (*)     All other components within normal limits    Narrative:     Performed at:  [facility],  [place], [facility]   Phone [phone]   SALICYLATE LEVEL - Abnormal; Notable for the following components:    Salicylate, Serum <2.0 (*)     All other components within normal limits    Narrative:     Performed at:  [facility],  [place], [facility]   Phone [phone]   ETHANOL    Narrative:     Performed at:  [facility],  [place], [facility]   Phone [phone]   URINE RT REFLEX TO CULTURE    Narrative:     Performed at:  [place] [address],  [place], [facility]   Phone [phone]   URINE DRUG SCREEN    Narrative:     Performed at:  [facility]) [facility],  [place], [facility]   Phone [phone]       All other labs were within normal range or not returned as of this dictation. EMERGENCY DEPARTMENT COURSE and DIFFERENTIAL DIAGNOSIS/MDM:   Vitals:    Vitals:    10/15/20 2014 10/15/20 2015   BP:  (!) 162/90   Pulse: 108    Resp: 18    Temp: 98.2 °F (36.8 °C)    TempSrc: Oral    SpO2: 97%        Patient evaluated previous record reviewed. Patient presents with depression and suicidal ideations. Vital signs notable for initial tachycardia that improved without intervention on EKG. Physical exam as documented above. Lab work-up largely unremarkable. Patient is medically cleared for further disposition via psychiatric staff.     CONSULTS:  None    PROCEDURES:  Unless otherwise noted below, none     Procedures      FINAL IMPRESSION      1. Suicidal ideation    2. Depression, unspecified depression type          DISPOSITION/PLAN   DISPOSITION        PATIENT REFERRED TO:  No follow-up provider specified. DISCHARGE MEDICATIONS:  New Prescriptions    No medications on file     Controlled Substances Monitoring:     No flowsheet data found.     (Please note that portions of this note were completed with a voice recognition program.  Efforts were made to edit the dictations but occasionally words are mis-transcribed.)    Vivek Sharp MD (electronically signed)  Attending Emergency Physician           Phil Martinez MD  10/15/20 9468

## 2020-10-25 ENCOUNTER — APPOINTMENT (OUTPATIENT)
Dept: CT IMAGING | Age: 25
End: 2020-10-25
Payer: COMMERCIAL

## 2020-10-25 ENCOUNTER — HOSPITAL ENCOUNTER (EMERGENCY)
Age: 25
Discharge: HOME | End: 2020-10-25
Attending: EMERGENCY MEDICINE
Payer: COMMERCIAL

## 2020-10-25 ENCOUNTER — APPOINTMENT (OUTPATIENT)
Dept: GENERAL RADIOLOGY | Age: 25
End: 2020-10-25
Payer: COMMERCIAL

## 2020-10-25 VITALS
TEMPERATURE: 98.4 F | DIASTOLIC BLOOD PRESSURE: 81 MMHG | OXYGEN SATURATION: 100 % | SYSTOLIC BLOOD PRESSURE: 130 MMHG | BODY MASS INDEX: 43.5 KG/M2 | RESPIRATION RATE: 20 BRPM | HEART RATE: 78 BPM | WEIGHT: 287 LBS | HEIGHT: 68 IN

## 2020-10-25 LAB
A/G RATIO: 1.4 (ref 1.1–2.2)
ALBUMIN SERPL-MCNC: 4.6 G/DL (ref 3.4–5)
ALP BLD-CCNC: 39 U/L (ref 40–129)
ALT SERPL-CCNC: 98 U/L (ref 10–40)
ANION GAP SERPL CALCULATED.3IONS-SCNC: 12 MMOL/L (ref 3–16)
AST SERPL-CCNC: 41 U/L (ref 15–37)
ATYPICAL LYMPHOCYTE RELATIVE PERCENT: 1 % (ref 0–6)
BANDED NEUTROPHILS RELATIVE PERCENT: 1 % (ref 0–7)
BASOPHILS ABSOLUTE: 0 K/UL (ref 0–0.2)
BASOPHILS RELATIVE PERCENT: 0 %
BILIRUB SERPL-MCNC: 0.5 MG/DL (ref 0–1)
BUN BLDV-MCNC: 9 MG/DL (ref 7–20)
CALCIUM SERPL-MCNC: 9.7 MG/DL (ref 8.3–10.6)
CHLORIDE BLD-SCNC: 104 MMOL/L (ref 99–110)
CO2: 23 MMOL/L (ref 21–32)
CREAT SERPL-MCNC: 0.7 MG/DL (ref 0.9–1.3)
D DIMER: 236 NG/ML DDU (ref 0–229)
EKG ATRIAL RATE: 93 BPM
EKG DIAGNOSIS: NORMAL
EKG P AXIS: 38 DEGREES
EKG P-R INTERVAL: 126 MS
EKG Q-T INTERVAL: 358 MS
EKG QRS DURATION: 104 MS
EKG QTC CALCULATION (BAZETT): 445 MS
EKG R AXIS: 38 DEGREES
EKG T AXIS: 74 DEGREES
EKG VENTRICULAR RATE: 93 BPM
EOSINOPHILS ABSOLUTE: 0 K/UL (ref 0–0.6)
EOSINOPHILS RELATIVE PERCENT: 0 %
GFR AFRICAN AMERICAN: >60
GFR NON-AFRICAN AMERICAN: >60
GLOBULIN: 3.2 G/DL
GLUCOSE BLD-MCNC: 103 MG/DL (ref 70–99)
HCT VFR BLD CALC: 45.4 % (ref 40.5–52.5)
HEMOGLOBIN: 15.3 G/DL (ref 13.5–17.5)
LYMPHOCYTES ABSOLUTE: 3.3 K/UL (ref 1–5.1)
LYMPHOCYTES RELATIVE PERCENT: 22 %
MCH RBC QN AUTO: 28.9 PG (ref 26–34)
MCHC RBC AUTO-ENTMCNC: 33.8 G/DL (ref 31–36)
MCV RBC AUTO: 85.6 FL (ref 80–100)
MONOCYTES ABSOLUTE: 0.4 K/UL (ref 0–1.3)
MONOCYTES RELATIVE PERCENT: 3 %
NEUTROPHILS ABSOLUTE: 10.5 K/UL (ref 1.7–7.7)
NEUTROPHILS RELATIVE PERCENT: 73 %
PDW BLD-RTO: 14.5 % (ref 12.4–15.4)
PLATELET # BLD: 329 K/UL (ref 135–450)
PLATELET SLIDE REVIEW: ADEQUATE
PMV BLD AUTO: 8.6 FL (ref 5–10.5)
POIKILOCYTES: ABNORMAL
POTASSIUM REFLEX MAGNESIUM: 3.8 MMOL/L (ref 3.5–5.1)
PRO-BNP: 45 PG/ML (ref 0–124)
RBC # BLD: 5.3 M/UL (ref 4.2–5.9)
SLIDE REVIEW: ABNORMAL
SODIUM BLD-SCNC: 139 MMOL/L (ref 136–145)
TOTAL PROTEIN: 7.8 G/DL (ref 6.4–8.2)
TROPONIN: <0.01 NG/ML
TSH REFLEX: 1.62 UIU/ML (ref 0.27–4.2)
WBC # BLD: 14.2 K/UL (ref 4–11)

## 2020-10-25 PROCEDURE — 71045 X-RAY EXAM CHEST 1 VIEW: CPT

## 2020-10-25 PROCEDURE — 84484 ASSAY OF TROPONIN QUANT: CPT

## 2020-10-25 PROCEDURE — 93010 ELECTROCARDIOGRAM REPORT: CPT | Performed by: INTERNAL MEDICINE

## 2020-10-25 PROCEDURE — 6360000004 HC RX CONTRAST MEDICATION: Performed by: EMERGENCY MEDICINE

## 2020-10-25 PROCEDURE — 85025 COMPLETE CBC W/AUTO DIFF WBC: CPT

## 2020-10-25 PROCEDURE — 71260 CT THORAX DX C+: CPT

## 2020-10-25 PROCEDURE — 80053 COMPREHEN METABOLIC PANEL: CPT

## 2020-10-25 PROCEDURE — 83880 ASSAY OF NATRIURETIC PEPTIDE: CPT

## 2020-10-25 PROCEDURE — 84443 ASSAY THYROID STIM HORMONE: CPT

## 2020-10-25 PROCEDURE — 85379 FIBRIN DEGRADATION QUANT: CPT

## 2020-10-25 PROCEDURE — 93005 ELECTROCARDIOGRAM TRACING: CPT | Performed by: EMERGENCY MEDICINE

## 2020-10-25 PROCEDURE — 99285 EMERGENCY DEPT VISIT HI MDM: CPT

## 2020-10-25 RX ORDER — SERTRALINE HYDROCHLORIDE 25 MG/1
25 TABLET, FILM COATED ORAL DAILY
COMMUNITY
End: 2020-12-14 | Stop reason: SDUPTHER

## 2020-10-25 RX ADMIN — IOPAMIDOL 75 ML: 755 INJECTION, SOLUTION INTRAVENOUS at 11:31

## 2020-10-25 NOTE — ED PROVIDER NOTES
[address]      Pt Name: Sedonia Shone  MRN: [id number]  [place] 1995  Date of evaluation: 10/25/2020  Provider: Chester Washington MD    CHIEF COMPLAINT       Chief Complaint   Patient presents with    Palpitations     pt states he was playing a video game and felt palpitations in chest for a few seconds. pt started on oloft 2 days ago for anxiety. vs wnl upon arrival to ED. HISTORY OF PRESENT ILLNESS   (Location/Symptom, Timing/Onset, Context/Setting, Quality, Duration, Modifying Factors, Severity)  Note limiting factors. Sedonia Shone is a 31 y.o. male with past medical history of anxiety, depression here today for palpitations    Patient states that just prior to arrival he was playing video games when all of a sudden he felt like his heart was beating irregularly. He states that he became very nervous. He got very sweaty. He states he felt approximately 10-12 irregular beats. He now states he feels anxious but those symptoms have resolved. No chest pain no significant shortness of breath. No cough or hemoptysis. No lower extremity swelling or edema. No recent prolonged immobility. Denies any syncope. No abdominal complaints. States he has felt occasional palpitations on and off for years but this is the first time it has lasted this long. No history of thyroid disease. No personal or family history of DVT/PE or early ACS    HPI    Nursing Notes were reviewed. REVIEW OF SYSTEMS    (2-9 systems for level 4, 10 or more for level 5)     Review of Systems    Please see HPI for pertinent positive and negative review of system findings. A full 10 system ROS was performed and otherwise negative. PAST MEDICAL HISTORY     Past Medical History:   Diagnosis Date    Anxiety     Depression     Dizziness          SURGICAL HISTORY     History reviewed. No pertinent surgical history.       CURRENT MEDICATIONS       Previous Medications    SERTRALINE (ZOLOFT) 25 MG TABLET    Take 25 mg by mouth daily       ALLERGIES     Amoxicillin    FAMILY HISTORY     History reviewed. No pertinent family history. SOCIAL HISTORY       Social History     Socioeconomic History    Marital status: Single     Spouse name: None    Number of children: None    Years of education: None    Highest education level: None   Occupational History    None   Social Needs    Financial resource strain: None    Food insecurity     Worry: None     Inability: None    Transportation needs     Medical: None     Non-medical: None   Tobacco Use    Smoking status: Former Smoker     Packs/day: 0.25     Years: 3.00     Pack years: 0.75     Types: Cigarettes    Smokeless tobacco: Current User     Types: Snuff   Substance and Sexual Activity    Alcohol use: No    Drug use: No    Sexual activity: Yes     Partners: Female   Lifestyle    Physical activity     Days per week: None     Minutes per session: None    Stress: None   Relationships    Social connections     Talks on phone: None     Gets together: None     Attends Taoist service: None     Active member of club or organization: None     Attends meetings of clubs or organizations: None     Relationship status: None    Intimate partner violence     Fear of current or ex partner: None     Emotionally abused: None     Physically abused: None     Forced sexual activity: None   Other Topics Concern    None   Social History Narrative    None       SCREENINGS               PHYSICAL EXAM    (up to 7 for level 4, 8 or more for level 5)     ED Triage Vitals [10/25/20 1011]   BP Temp Temp Source Pulse Resp SpO2 Height Weight   126/86 98.4 °F (36.9 °C) Oral 83 20 100 % 5' 8\" (1.727 m) 287 lb (130.2 kg)       Physical Exam    General appearance:  Cooperative. No acute distress. Skin:  Warm. Dry. Eye:  Extraocular movements intact. Ears, nose, mouth and throat:  Oral mucosa moist,  Neck:  Trachea midline. Heart:  Regular rate and rhythm  Perfusion:  intact  Respiratory:  Lungs clear to auscultation bilaterally. Respirations nonlabored. Abdominal:   Non distended. Nontender  Neurological:  Alert and oriented x 3. Moves all extremities spontaneously  Musculoskeletal:   Normal ROM, no deformities          Psychiatric:  Normal mood      DIAGNOSTIC RESULTS       Labs Reviewed   CBC WITH AUTO DIFFERENTIAL - Abnormal; Notable for the following components:       Result Value    WBC 14.2 (*)     Neutrophils Absolute 10.5 (*)     Poikilocytes Occasional (*)     All other components within normal limits    Narrative:     Performed at:  [facility] [place]. [facility]  [facility]. [facility], [facility]    Phone [phone]   COMPREHENSIVE METABOLIC PANEL W/ REFLEX TO MG FOR LOW K - Abnormal; Notable for the following components:    Glucose 103 (*)     CREATININE 0.7 (*)     Alkaline Phosphatase 39 (*)     ALT 98 (*)     AST 41 (*)     All other components within normal limits    Narrative:     Performed at:  [facility] [place]. [facility]  [email or web address]. [facility], [facility]    Phone [phone]   D-DIMER, QUANTITATIVE - Abnormal; Notable for the following components:    D-Dimer, Quant 236 (*)     All other components within normal limits    Narrative:     Performed at:  [facility] [place]. [facility]  [address] [place], [place] [address]. [facility], [facility]   Phone [phone]   TROPONIN    Narrative:     Performed at:  [facility] [place]. [facility]  [address] [place], [place] [address] [address]. [facility], [facility]   Phone [phone]    Narrative:     Performed at:  [facility] [place]. [facility]  [address] [place], [place] [address].  [place], [facility]   Phone [phone]   TSH WITH REFLEX       Interpretation per the Radiologist below, if obtained/available at the time of this note:    CT CHEST PULMONARY EMBOLISM W CONTRAST   Preliminary Result   1. No evidence of pulmonary embolic disease. 2. No acute pulmonary findings. 3. Calcified left hilar nodes. Small noncalcified right hilar right node   with small noncalcified pulmonary nodule likely granulomatous. XR CHEST PORTABLE   Final Result   No acute cardiopulmonary disease. All other labs/imaging were within normal range or not returned as of this dictation. EMERGENCY DEPARTMENT COURSE and DIFFERENTIAL DIAGNOSIS/MDM:   Vitals:    Vitals:    10/25/20 1011   BP: 126/86   Pulse: 83   Resp: 20   Temp: 98.4 °F (36.9 °C)   TempSrc: Oral   SpO2: 100%   Weight: 287 lb (130.2 kg)   Height: 5' 8\" (1.727 m)       EKG: Sinus rhythm rate of 93 bpm with incomplete right bundle branch block. No ST elevation. Isolated flipped T wave in aVL. No prior    Patient presents emergency department today complaining of palpitations that have since resolved. No chest pain. Patient obese but no other risk factors for cardiovascular disease. No direct risk factors for DVT/PE. Nonspecific flipped T wave in aVL with incomplete bundle branch block. No other ischemic changes. Troponin negative. D-dimer slightly elevated prompted CT scan showing old granulomatous disease but no other acute abnormality. Patient has had no arrhythmia here. Otherwise asymptomatic. Thyroid testing pending as it is a send out test but may be followed up by his primary care physician to whom which he was referred. Otherwise may be discharged home and PCP may consider outpatient Holter monitor. MDM    CONSULTS     None    Critical Care:   None    REASSESSMENT          PROCEDURE     Unless otherwise noted below, none     Procedures      FINAL IMPRESSION      1.  Palpitations            DISPOSITION/PLAN   DISPOSITION Decision To Discharge 10/25/2020 11:53:39 AM        PATIENT REFERRED TO:  SHADIA Escalera - FAHAD  [address] [facility] [address]  [phone]    Schedule an appointment as soon as possible for a visit         DISCHARGE MEDICATIONS:  New Prescriptions    No medications on file     Controlled Substances Monitoring:     No flowsheet data found.     (Please note that portions of this note were completed with a voice recognition program.  Efforts were made to edit the dictations but occasionally words are mis-transcribed.)    Edwin Knight MD (electronically signed)  Attending Emergency Physician            Marleny Barnard MD  10/25/20 4146

## 2020-12-11 PROBLEM — R94.31 ABNORMAL EKG: Status: ACTIVE | Noted: 2020-12-11

## 2020-12-11 NOTE — PROGRESS NOTES
[facility] Office consultation Note  12/14/2020   Ref by Ernestina SONG  Subjective:  Mr. Enedina Beebe is here with abnormal EKG and palpitations. HPI:   Today he reports he has anxiety attacks and depression. He believes the anxiety started about 2 years ago and precipitates palpitations. His grandfather passed 2 years ago which initiated his depression. He reports the anxiety starts with one hand tingling. He reports he starts having a panic attack. He has sharp CP and heart racing. He reports his symptoms last about 15 minutes. He reports since starting zoloft his symptoms occur 1-2 times a week compared to several times a day before starting Zoloft. He feels he sleeps well. His weight is stable. He denies dizziness, BLE edema or syncope. No chest pain on exertion. PMH:  Kadeem Beebe 1995 with abnormal EKG. His EKG from 10/25/20 showed SR with IRBBB. No heart disease HBP or DM. He has fatty liver. Review of Systems:12 point ROS negative in all areas as listed below except as in [place]  Constitutional, EENT,  pulmonary, GI, , Musculoskeletal, skin,  hematological, endocrine, Psychiatric    Reviewed past medical history, social, and family history. Does not know father's medical history. Grandfather had heart disease. He reports he vapes with 3 mg of nicotine. He denies alcohol. He babysits his nephew. 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 Past Medical History:   Diagnosis Date    Anxiety     Depression     Dizziness      History reviewed. No pertinent surgical history. Objective:   /76   Pulse 88   Ht 5' 8\" (1.727 m)   Wt 271 lb (122.9 kg)   SpO2 100%   BMI 41.21 kg/m²     Wt Readings from Last 3 Encounters:   12/14/20 271 lb (122.9 kg)   10/25/20 287 lb (130.2 kg)   05/01/20 275 lb (124.7 kg)       Physical Exam:  He is obese. General: No Respiratory distress, appears well developed and well nourished. Eyes:  Sclera nonicteric  Nose/Sinuses:  negative findings: nose shows no deformity, asymmetry, or inflammation, nasal mucosa normal, septum midline with no perforation or bleeding  Back:  no pain to palpation  Joint:  no active joint inflammation  Musculoskeletal:  negative  Skin:  Warm and dry  Neck:  Negative for JVD and Carotid Bruits. Chest:  Clear to auscultation, respiration easy  Cardiovascular:  RRR, S1S2 normal, no murmur, no rub or thrill. Abdomen:  Soft normal liver and spleen  Extremities:   No edema, clubbing, cyanosis,  Pulses: Femoral and pedal pulses are normal.  Neuro: intact    Medications:   Outpatient Encounter Medications as of 12/14/2020   Medication Sig Dispense Refill    sertraline (ZOLOFT) 50 MG tablet Take 1 tablet by mouth daily 90 tablet 3    [DISCONTINUED] sertraline (ZOLOFT) 25 MG tablet Take 25 mg by mouth daily       No facility-administered encounter medications on file as of 12/14/2020. Lab Data:  CBC: No results for input(s): WBC, HGB, HCT, MCV, PLT in the last 72 hours. BMP: No results for input(s): NA, K, CL, CO2, PHOS, BUN, CREATININE in the last 72 hours. Invalid input(s): CA  LIVER PROFILE: No results for input(s): AST, ALT, LIPASE, BILIDIR, BILITOT, ALKPHOS in the last 72 hours. Invalid input(s): AMYLASE,  ALB  LIPID: No results found for: CHOL  No results found for: TRIG  No results found for: HDL  No results found for: LDLCHOLESTEROL, LDLCALC  No results found for: LABVLDL, VLDL  No results found for: CHOLHDLRATIO  PT/INR: No results for input(s): PROTIME, INR in the last 72 hours. A1C: No results found for: LABA1C  BNP:  No results for input(s): BNP in the last 72 hours.     IMAGING:   I have reviewed the following tests and documented in this encounter as follows:   Discussed with the patient  EKG 12.14.20 within normal limits  CT chest 10/25/20 1. No evidence of pulmonary embolic disease. 2. No acute pulmonary findings. 3. Calcified left hilar nodes. Small noncalcified right hilar right node with small noncalcified pulmonary nodule likely granulomatous. likely fungal as it is endemic in this area    Assessment:  Lillian Dalton was seen today for new patient. Diagnoses and all orders for this visit:  Encounter Diagnoses   Name Primary?  Abnormal EKG Yes    Palpitations     Precordial pain     Mixed hyperlipidemia     Depression, unspecified depression type     Generalized anxiety disorder with panic attacks     Fatty liver        Mixed hyperlipidemia  -     LIPID PANEL; Future    Abnormal EKG  -     EKG 12 lead: normal   -     Echo 2D w doppler w color complete; Future    Palpitations  -     Echo 2D w doppler w color complete; Future    Precordial pain  -     Echo 2D w doppler w color complete; Future          Plan:  1. Fasting Lipids  2. Echo for palpitations, chest pain   3. Recommend increasing zoloft to 50 mg daily since it helped his symptoms at lower dose  4. The patient is asked to make an attempt to improve diet with low fat low sugar diet changes and exercise patterns to aid in medical management of this problem. Recommend starting a walking regimen. It will help his fatty liver and prevent long term liver damage. 5. Follow up as need if testing unremarkable      Scribe's attestation: This note was scribed in the presence of Dr. Torrey Zheng by [facility] RN     I, Dr. Torrey Zheng, personally performed the services described in this documentation, as scribed by the above signed scribe in my presence. It is both accurate and complete to my knowledge. I agree with the details independently gathered by the clinical support staff, while the remaining scribed note accurately describes my personal service to the patient.       Bonny Olmstead MD 12/14/2020 9:33 AM

## 2020-12-14 ENCOUNTER — OFFICE VISIT (OUTPATIENT)
Dept: CARDIOLOGY CLINIC | Age: 25
End: 2020-12-14
Payer: COMMERCIAL

## 2020-12-14 VITALS
DIASTOLIC BLOOD PRESSURE: 76 MMHG | WEIGHT: 271 LBS | HEART RATE: 88 BPM | OXYGEN SATURATION: 100 % | SYSTOLIC BLOOD PRESSURE: 130 MMHG | HEIGHT: 68 IN | BODY MASS INDEX: 41.07 KG/M2

## 2020-12-14 PROBLEM — R07.2 PRECORDIAL PAIN: Status: ACTIVE | Noted: 2020-12-14

## 2020-12-14 PROBLEM — R00.2 PALPITATIONS: Status: ACTIVE | Noted: 2020-12-14

## 2020-12-14 PROCEDURE — G8417 CALC BMI ABV UP PARAM F/U: HCPCS | Performed by: INTERNAL MEDICINE

## 2020-12-14 PROCEDURE — G8427 DOCREV CUR MEDS BY ELIG CLIN: HCPCS | Performed by: INTERNAL MEDICINE

## 2020-12-14 PROCEDURE — G8484 FLU IMMUNIZE NO ADMIN: HCPCS | Performed by: INTERNAL MEDICINE

## 2020-12-14 PROCEDURE — 99244 OFF/OP CNSLTJ NEW/EST MOD 40: CPT | Performed by: INTERNAL MEDICINE

## 2020-12-14 PROCEDURE — 93000 ELECTROCARDIOGRAM COMPLETE: CPT | Performed by: INTERNAL MEDICINE

## 2020-12-14 NOTE — PATIENT INSTRUCTIONS
Plan:  1. Fasting Lipids  2. Echo for palpitations, chest pain   3. Recommend increasing zoloft to 50 mg daily  4. The patient is asked to make an attempt to improve diet with low fat low sugar diet changes and exercise patterns to aid in medical management of this problem. Recommend starting a walking regimen. 5. Follow up as needed if testing unremarkable      Your provider has ordered testing for further evaluation. An order/prescription has been included in your paper work. ? To schedule outpatient testing, contact Central Scheduling by calling [address] [place] [address] ([phone]).

## 2020-12-14 NOTE — LETTER
Eyes:  Sclera nonicteric  Nose/Sinuses:  negative findings: nose shows no deformity, asymmetry, or inflammation, nasal mucosa normal, septum midline with no perforation or bleeding  Back:  no pain to palpation  Joint:  no active joint inflammation  Musculoskeletal:  negative  Skin:  Warm and dry  Neck:  Negative for JVD and Carotid Bruits. Chest:  Clear to auscultation, respiration easy  Cardiovascular:  RRR, S1S2 normal, no murmur, no rub or thrill. Abdomen:  Soft normal liver and spleen  Extremities:   No edema, clubbing, cyanosis,  Pulses: Femoral and pedal pulses are normal.  Neuro: intact    Medications:   Outpatient Encounter Medications as of 12/14/2020   Medication Sig Dispense Refill    sertraline (ZOLOFT) 50 MG tablet Take 1 tablet by mouth daily 90 tablet 3    [DISCONTINUED] sertraline (ZOLOFT) 25 MG tablet Take 25 mg by mouth daily       No facility-administered encounter medications on file as of 12/14/2020. Lab Data:  CBC: No results for input(s): WBC, HGB, HCT, MCV, PLT in the last 72 hours. BMP: No results for input(s): NA, K, CL, CO2, PHOS, BUN, CREATININE in the last 72 hours. Invalid input(s): CA  LIVER PROFILE: No results for input(s): AST, ALT, LIPASE, BILIDIR, BILITOT, ALKPHOS in the last 72 hours. Invalid input(s): AMYLASE,  ALB  LIPID: No results found for: CHOL  No results found for: TRIG  No results found for: HDL  No results found for: LDLCHOLESTEROL, LDLCALC  No results found for: LABVLDL, VLDL  No results found for: CHOLHDLRATIO  PT/INR: No results for input(s): PROTIME, INR in the last 72 hours. A1C: No results found for: LABA1C  BNP:  No results for input(s): BNP in the last 72 hours.     IMAGING:   I have reviewed the following tests and documented in this encounter as follows:   Discussed with the patient  EKG 12.14.20 within normal limits  CT chest 10/25/20

## 2020-12-21 ENCOUNTER — HOSPITAL ENCOUNTER (OUTPATIENT)
Dept: NON INVASIVE DIAGNOSTICS | Age: 25
Discharge: HOME | End: 2020-12-21
Payer: COMMERCIAL

## 2020-12-21 LAB
LV EF: 58 %
LVEF MODALITY: NORMAL

## 2020-12-21 PROCEDURE — 93306 TTE W/DOPPLER COMPLETE: CPT

## 2020-12-23 ENCOUNTER — TELEPHONE (OUTPATIENT)
Dept: CARDIOLOGY CLINIC | Age: 25
End: 2020-12-23

## 2020-12-23 NOTE — TELEPHONE ENCOUNTER
----- Message from Maurizio Delgadillo MD sent at 12/23/2020  2:20 PM EST -----  Please call patient his echocardiogram is normal.  Since he is feeling better with zoloft we wont do any more testing at this time however if he continues to have palpitations he needs to call me and we can do 7 day event monitor. He can see me as needed.

## 2020-12-29 NOTE — TELEPHONE ENCOUNTER
Called listed number. His mother Melissa Love answered the phone. Savanah Zhu was not home. She Is listed under pt contact. I relayed [facility] results note. She will have pt call the office if any questions.

## 2021-05-12 ENCOUNTER — HOSPITAL ENCOUNTER (OUTPATIENT)
Dept: NEUROLOGY | Age: 26
Discharge: HOME | End: 2021-05-12
Payer: COMMERCIAL

## 2021-05-12 DIAGNOSIS — R20.0 TACTILE ANESTHESIA: ICD-10-CM

## 2021-05-12 PROCEDURE — 95886 MUSC TEST DONE W/N TEST COMP: CPT

## 2021-05-12 PROCEDURE — 95908 NRV CNDJ TST 3-4 STUDIES: CPT

## 2021-05-12 NOTE — PROCEDURES
Electrodiagnostic Report  [address] [place] [address]  Physical Medicine & Rehabilitation    05/12/21    Maverick Linker  31 y.o.  1995  [id number]  Referring Provider: NOHEMI Reyna    Clinical Problem:  31 y.o with history of  Right leg pain, paresthesias after helping friend move. One and one half year ago. Pain paresthesias right thigh.   PMH: no endocrine disease no back or leg surgery PE:  Reflexes 1+, normal strength    EMG SUMMARY TABLE RIGHT LOWER       Spontaneous     MUAP   Recruitment    Insertional Activity Fibrillation Potential Positive Sharp Waves   Fasiculation High Frequency Potentials   Amp Duration PPP Pattern   Vastus Medialis L2-4 Femoral normal none none none none normal normal normal normal   Iliopsoas L23 Fem normal none none none none normal normal normal normal   Anterior Tibialis L4,5 Deep Peroneal normal none none none none normal normal normal normal   Gluteus Medius L4-S1 Superior Gut normal none none none none normal normal normal normal   Peroneus Longus L5-S1 Sup Peroneal normal none none none none normal normal normal normal   Posterior Tibialis L5-S1 Tibial normal none none none none normal normal normal normal   Medial Gastroc S1,2 Tibial normal none none none none normal normal normal normal   Extensor Hallicis Z6-H7 Peroneal normal none none none none normal normal normal normal   Extensor Dig Brevis L5-S1 Peroneal normal none none none none normal normal normal normal   LS Paraspinals Posterior Rami       normal none none none none normal normal normal normal       Nerve Conduction Studies     Nerve Sensory Distal Latency (msec) Sensory Distal Latency (msec) Amp uv Amp uv Motor Distal Latency (msec) Motor Distal Latency (msec) Amp uv Amp uv Motor NCV (m/sec) Motor NCV (m/sec)    Right Left Right Left Right Left Right Left Right Left   Sural 3.0 (n<4.2) (n<4.2) 12          Peroneal     5.4 (n<5.5) (n<5.5) 2.0   48 (n>40) (n>40)   Above Knee (n>40) (n>40)   Tibial     (n<6.2) (n<6.2)   (n>40) (n>40)   Lat fem cut  5.1  3.0  5 5             Summary of EMG and Nerve Conduction Findings: The above EMG needle exam was within normal limits. Nerve conduction studies demonstrate prolongation of right lateral femoral cutaneous nerve. Remainder within normal limits. 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 No evidence of an acute radiculopathy or other entrapment neuropathy. Thank you. Electronically signed by:  Cecil Louis DO,5/12/2021,10:39 AM

## 2022-04-11 ENCOUNTER — INITIAL CONSULT (OUTPATIENT)
Dept: SURGERY | Age: 27
End: 2022-04-11
Payer: COMMERCIAL

## 2022-04-11 VITALS
WEIGHT: 294.2 LBS | DIASTOLIC BLOOD PRESSURE: 87 MMHG | HEART RATE: 99 BPM | BODY MASS INDEX: 44.59 KG/M2 | HEIGHT: 68 IN | SYSTOLIC BLOOD PRESSURE: 138 MMHG | TEMPERATURE: 97.7 F

## 2022-04-11 DIAGNOSIS — L91.8 SKIN TAG: Primary | ICD-10-CM

## 2022-04-11 PROCEDURE — 99241 PR OFFICE CONSULTATION NEW/ESTAB PATIENT 15 MIN: CPT | Performed by: SURGERY

## 2022-04-11 PROCEDURE — G8427 DOCREV CUR MEDS BY ELIG CLIN: HCPCS | Performed by: SURGERY

## 2022-04-11 PROCEDURE — 11200 RMVL SKIN TAGS UP TO&INC 15: CPT | Performed by: SURGERY

## 2022-04-11 PROCEDURE — G8417 CALC BMI ABV UP PARAM F/U: HCPCS | Performed by: SURGERY

## 2022-05-12 NOTE — PROGRESS NOTES
[facility]    HPI:  Patient is 31y.o. year old male seen at request of SHADIA De La Torre CNP. He presents because of lesion located on lower back. There has been pain at or near the lesion and a recent increase in size. There is not  previous history of similar. There has not been any biopsy. Past Medical History:   Diagnosis Date    Anxiety     Depression     Dizziness        No past surgical history on file. Current Outpatient Medications on File Prior to Visit   Medication Sig Dispense Refill    sertraline (ZOLOFT) 50 MG tablet Take 1 tablet by mouth daily (Patient taking differently: Take 100 mg by mouth daily ) 90 tablet 3     No current facility-administered medications on file prior to visit. Allergies   Allergen Reactions    Amoxicillin Nausea And Vomiting       Social History     Socioeconomic History    Marital status: Single     Spouse name: Not on file    Number of children: Not on file    Years of education: Not on file    Highest education level: Not on file   Occupational History    Not on file   Tobacco Use    Smoking status: Former Smoker     Packs/day: 0.25     Years: 3.00     Pack years: 0.75     Types: Cigarettes    Smokeless tobacco: Current User     Types: Snuff   Vaping Use    Vaping Use: Every day   Substance and Sexual Activity    Alcohol use: No    Drug use: No    Sexual activity: Yes     Partners: Female   Other Topics Concern    Not on file   Social History Narrative    Not on file     Social Determinants of Health     Financial Resource Strain:     Difficulty of Paying Living Expenses: Not on file   Food Insecurity:     Worried About Running Out of Food in the Last Year: Not on file    [place] of Food in the Last Year: Not on file   Transportation Needs:     Lack of Transportation (Medical): Not on file    Lack of Transportation (Non-Medical):  Not on file   Physical Activity:     Days of Exercise per Week: Not on file    Minutes of Exercise per Session: Not on file   Stress:     Feeling of Stress : Not on file   Social Connections:     Frequency of Communication with Friends and Family: Not on file    Frequency of Social Gatherings with Friends and Family: Not on file    Attends Buddhism Services: Not on file    Active Member of Clubs or Organizations: Not on file    Attends Club or Organization Meetings: Not on file    Marital Status: Not on file   Intimate Partner Violence:     Fear of Current or Ex-Partner: Not on file    Emotionally Abused: Not on file    Physically Abused: Not on file    Sexually Abused: Not on file   Housing Stability:     Unable to Pay for Housing in the Last Year: Not on file    Number of [place] in the Last Year: Not on file    Unstable Housing in the Last Year: Not on file       No family history on file. ROS:  He reports no complaints related to the eyes, ears , nose throat or mouth. He denies weight loss. No chest pain. No SOB. No urinary complaints. No musculoskeletal complaints. Skin complaints include lesion low back. No neurologic deficits. No bleeding tendencies. No GI complaints. Physical Exam:  Vitals:    04/11/22 1407   BP: 138/87   Pulse: 99   Temp:    294#  General:  Comfortable. No distress. Eyes:  No scleral icterus  Ears:  Normal  Nose:  Normal  Mouth:  Mucous membranes moist  Respiratory: Lungs CTA. No accessory muscle use. Heart:  Regular rhythm  Abdomen:  Soft. Non tender. Non distended. Musculoskeletal:  No abnormal movements. ROM extremities normal.  Skin:  No rashes. Lesion    Location:   Back   Pigmented:   Yes   Diameter:  1 cm   Crusting absent             Neurologic:  No focal deficits. Psychiatric:  AAA. O x 3. Procedure:  Alcohol prep used. Lidocaine anesthetic used. Skin tag low back amputated with scissors. Specimen discarded. Pressure dressing placed.         ASSESSMENT:  1. Skin tag            PLAN:  Local wound care instructions. Follow up as needed.

## 2024-12-29 ENCOUNTER — HOSPITAL ENCOUNTER (EMERGENCY)
Age: 29
Discharge: HOME | End: 2024-12-29
Attending: EMERGENCY MEDICINE
Payer: COMMERCIAL

## 2024-12-29 ENCOUNTER — APPOINTMENT (OUTPATIENT)
Dept: CT IMAGING | Age: 29
End: 2024-12-29
Payer: COMMERCIAL

## 2024-12-29 VITALS
WEIGHT: 315 LBS | TEMPERATURE: 98.5 F | HEART RATE: 90 BPM | SYSTOLIC BLOOD PRESSURE: 140 MMHG | DIASTOLIC BLOOD PRESSURE: 85 MMHG | RESPIRATION RATE: 16 BRPM | HEIGHT: 71 IN | OXYGEN SATURATION: 100 % | BODY MASS INDEX: 44.1 KG/M2

## 2024-12-29 DIAGNOSIS — K76.0 FATTY LIVER: ICD-10-CM

## 2024-12-29 DIAGNOSIS — R19.7 DIARRHEA, UNSPECIFIED TYPE: Primary | ICD-10-CM

## 2024-12-29 DIAGNOSIS — F41.1 ANXIETY STATE: ICD-10-CM

## 2024-12-29 LAB
ALBUMIN SERPL-MCNC: 4.1 G/DL (ref 3.4–5)
ALBUMIN/GLOB SERPL: 1.1 {RATIO} (ref 1.1–2.2)
ALP SERPL-CCNC: 47 U/L (ref 40–129)
ALT SERPL-CCNC: 132 U/L (ref 10–40)
ANION GAP SERPL CALCULATED.3IONS-SCNC: 15 MMOL/L (ref 3–16)
AST SERPL-CCNC: 59 U/L (ref 15–37)
BASOPHILS # BLD: 0.2 K/UL (ref 0–0.2)
BASOPHILS NFR BLD: 1.2 %
BILIRUB SERPL-MCNC: 0.5 MG/DL (ref 0–1)
BILIRUB UR QL STRIP.AUTO: NEGATIVE
BUN SERPL-MCNC: 10 MG/DL (ref 7–20)
CALCIUM SERPL-MCNC: 9.2 MG/DL (ref 8.3–10.6)
CHLORIDE SERPL-SCNC: 100 MMOL/L (ref 99–110)
CLARITY UR: CLEAR
CO2 SERPL-SCNC: 20 MMOL/L (ref 21–32)
COLOR UR: YELLOW
CREAT SERPL-MCNC: 0.6 MG/DL (ref 0.9–1.3)
DEPRECATED RDW RBC AUTO: 15.7 % (ref 12.4–15.4)
EOSINOPHIL # BLD: 0.3 K/UL (ref 0–0.6)
EOSINOPHIL NFR BLD: 2.2 %
GFR SERPLBLD CREATININE-BSD FMLA CKD-EPI: >90 ML/MIN/{1.73_M2}
GLUCOSE SERPL-MCNC: 229 MG/DL (ref 70–99)
GLUCOSE UR STRIP.AUTO-MCNC: >=1000 MG/DL
HCT VFR BLD AUTO: 41.4 % (ref 40.5–52.5)
HGB BLD-MCNC: 13.4 G/DL (ref 13.5–17.5)
HGB UR QL STRIP.AUTO: NEGATIVE
KETONES UR STRIP.AUTO-MCNC: 15 MG/DL
LEUKOCYTE ESTERASE UR QL STRIP.AUTO: NEGATIVE
LIPASE SERPL-CCNC: 37 U/L (ref 13–60)
LYMPHOCYTES # BLD: 3 K/UL (ref 1–5.1)
LYMPHOCYTES NFR BLD: 21.5 %
MCH RBC QN AUTO: 26.8 PG (ref 26–34)
MCHC RBC AUTO-ENTMCNC: 32.3 G/DL (ref 31–36)
MCV RBC AUTO: 82.8 FL (ref 80–100)
MONOCYTES # BLD: 0.9 K/UL (ref 0–1.3)
MONOCYTES NFR BLD: 6.9 %
NEUTROPHILS # BLD: 9.4 K/UL (ref 1.7–7.7)
NEUTROPHILS NFR BLD: 68.2 %
NITRITE UR QL STRIP.AUTO: NEGATIVE
PH UR STRIP.AUTO: 6 [PH] (ref 5–8)
PLATELET # BLD AUTO: 268 K/UL (ref 135–450)
PMV BLD AUTO: 8.7 FL (ref 5–10.5)
POTASSIUM SERPL-SCNC: 3.8 MMOL/L (ref 3.5–5.1)
PROT SERPL-MCNC: 8 G/DL (ref 6.4–8.2)
PROT UR STRIP.AUTO-MCNC: NEGATIVE MG/DL
RBC # BLD AUTO: 5 M/UL (ref 4.2–5.9)
SODIUM SERPL-SCNC: 135 MMOL/L (ref 136–145)
SP GR UR STRIP.AUTO: <=1.005 (ref 1–1.03)
UA COMPLETE W REFLEX CULTURE PNL UR: ABNORMAL
UA DIPSTICK W REFLEX MICRO PNL UR: ABNORMAL
URN SPEC COLLECT METH UR: ABNORMAL
UROBILINOGEN UR STRIP-ACNC: 0.2 E.U./DL
WBC # BLD AUTO: 13.7 K/UL (ref 4–11)

## 2024-12-29 PROCEDURE — 36415 COLL VENOUS BLD VENIPUNCTURE: CPT

## 2024-12-29 PROCEDURE — 99285 EMERGENCY DEPT VISIT HI MDM: CPT

## 2024-12-29 PROCEDURE — 6360000002 HC RX W HCPCS: Performed by: EMERGENCY MEDICINE

## 2024-12-29 PROCEDURE — 83690 ASSAY OF LIPASE: CPT

## 2024-12-29 PROCEDURE — 74177 CT ABD & PELVIS W/CONTRAST: CPT

## 2024-12-29 PROCEDURE — 96374 THER/PROPH/DIAG INJ IV PUSH: CPT

## 2024-12-29 PROCEDURE — 6360000004 HC RX CONTRAST MEDICATION: Performed by: EMERGENCY MEDICINE

## 2024-12-29 PROCEDURE — 80053 COMPREHEN METABOLIC PANEL: CPT

## 2024-12-29 PROCEDURE — 85025 COMPLETE CBC W/AUTO DIFF WBC: CPT

## 2024-12-29 PROCEDURE — 81003 URINALYSIS AUTO W/O SCOPE: CPT

## 2024-12-29 RX ORDER — LORAZEPAM 2 MG/ML
1 INJECTION INTRAMUSCULAR ONCE
Status: COMPLETED | OUTPATIENT
Start: 2024-12-29 | End: 2024-12-29

## 2024-12-29 RX ORDER — IOPAMIDOL 755 MG/ML
75 INJECTION, SOLUTION INTRAVASCULAR
Status: COMPLETED | OUTPATIENT
Start: 2024-12-29 | End: 2024-12-29

## 2024-12-29 RX ADMIN — IOPAMIDOL 75 ML: 755 INJECTION, SOLUTION INTRAVENOUS at 22:41

## 2024-12-29 RX ADMIN — LORAZEPAM 1 MG: 2 INJECTION INTRAMUSCULAR; INTRAVENOUS at 22:32

## 2024-12-29 ASSESSMENT — PAIN DESCRIPTION - PAIN TYPE: TYPE: ACUTE PAIN

## 2024-12-29 ASSESSMENT — PAIN DESCRIPTION - DESCRIPTORS: DESCRIPTORS: DISCOMFORT

## 2024-12-29 ASSESSMENT — PAIN - FUNCTIONAL ASSESSMENT
PAIN_FUNCTIONAL_ASSESSMENT: NONE - DENIES PAIN
PAIN_FUNCTIONAL_ASSESSMENT: 0-10

## 2024-12-29 ASSESSMENT — PAIN DESCRIPTION - LOCATION: LOCATION: ABDOMEN

## 2024-12-29 ASSESSMENT — PAIN SCALES - GENERAL: PAINLEVEL_OUTOF10: 5

## 2024-12-29 ASSESSMENT — PAIN DESCRIPTION - ORIENTATION: ORIENTATION: LEFT;LOWER

## 2025-01-11 ENCOUNTER — HOSPITAL ENCOUNTER (EMERGENCY)
Age: 30
Discharge: HOME | End: 2025-01-11
Attending: STUDENT IN AN ORGANIZED HEALTH CARE EDUCATION/TRAINING PROGRAM
Payer: COMMERCIAL

## 2025-01-11 ENCOUNTER — APPOINTMENT (OUTPATIENT)
Dept: CT IMAGING | Age: 30
End: 2025-01-11
Payer: COMMERCIAL

## 2025-01-11 ENCOUNTER — HOSPITAL ENCOUNTER (EMERGENCY)
Age: 30
Discharge: LEFT BEFORE DISCHARGE | End: 2025-01-11
Attending: STUDENT IN AN ORGANIZED HEALTH CARE EDUCATION/TRAINING PROGRAM
Payer: COMMERCIAL

## 2025-01-11 VITALS
HEART RATE: 87 BPM | SYSTOLIC BLOOD PRESSURE: 137 MMHG | HEIGHT: 72 IN | DIASTOLIC BLOOD PRESSURE: 88 MMHG | WEIGHT: 308.4 LBS | TEMPERATURE: 98.4 F | OXYGEN SATURATION: 99 % | RESPIRATION RATE: 19 BRPM | BODY MASS INDEX: 41.77 KG/M2

## 2025-01-11 VITALS
WEIGHT: 311.2 LBS | DIASTOLIC BLOOD PRESSURE: 72 MMHG | SYSTOLIC BLOOD PRESSURE: 125 MMHG | HEIGHT: 72 IN | TEMPERATURE: 98.5 F | RESPIRATION RATE: 14 BRPM | OXYGEN SATURATION: 98 % | HEART RATE: 79 BPM | BODY MASS INDEX: 42.15 KG/M2

## 2025-01-11 DIAGNOSIS — R29.810 FACIAL DROOP: Primary | ICD-10-CM

## 2025-01-11 DIAGNOSIS — G51.0 BELL'S PALSY: Primary | ICD-10-CM

## 2025-01-11 LAB
ALBUMIN SERPL-MCNC: 4.2 G/DL (ref 3.4–5)
ALBUMIN/GLOB SERPL: 1 {RATIO} (ref 1.1–2.2)
ALP SERPL-CCNC: 47 U/L (ref 40–129)
ALT SERPL-CCNC: 193 U/L (ref 10–40)
ANION GAP SERPL CALCULATED.3IONS-SCNC: 12 MMOL/L (ref 3–16)
AST SERPL-CCNC: 90 U/L (ref 15–37)
BASOPHILS # BLD: 0.1 K/UL (ref 0–0.2)
BASOPHILS NFR BLD: 0.5 %
BILIRUB SERPL-MCNC: 0.3 MG/DL (ref 0–1)
BUN SERPL-MCNC: 10 MG/DL (ref 7–20)
CALCIUM SERPL-MCNC: 9.4 MG/DL (ref 8.3–10.6)
CHLORIDE SERPL-SCNC: 100 MMOL/L (ref 99–110)
CO2 SERPL-SCNC: 23 MMOL/L (ref 21–32)
CREAT SERPL-MCNC: <0.5 MG/DL (ref 0.9–1.3)
DEPRECATED RDW RBC AUTO: 16 % (ref 12.4–15.4)
EKG ATRIAL RATE: 96 BPM
EKG DIAGNOSIS: NORMAL
EKG P AXIS: 43 DEGREES
EKG P-R INTERVAL: 112 MS
EKG Q-T INTERVAL: 364 MS
EKG QRS DURATION: 94 MS
EKG QTC CALCULATION (BAZETT): 459 MS
EKG R AXIS: 75 DEGREES
EKG T AXIS: 12 DEGREES
EKG VENTRICULAR RATE: 96 BPM
EOSINOPHIL # BLD: 0.4 K/UL (ref 0–0.6)
EOSINOPHIL NFR BLD: 3.1 %
GFR SERPLBLD CREATININE-BSD FMLA CKD-EPI: >90 ML/MIN/{1.73_M2}
GLUCOSE BLD-MCNC: 163 MG/DL (ref 70–99)
GLUCOSE SERPL-MCNC: 180 MG/DL (ref 70–99)
HCT VFR BLD AUTO: 42.5 % (ref 40.5–52.5)
HGB BLD-MCNC: 13.8 G/DL (ref 13.5–17.5)
INR PPP: 0.94 (ref 0.85–1.15)
LYMPHOCYTES # BLD: 3.3 K/UL (ref 1–5.1)
LYMPHOCYTES NFR BLD: 25.2 %
MCH RBC QN AUTO: 26.8 PG (ref 26–34)
MCHC RBC AUTO-ENTMCNC: 32.4 G/DL (ref 31–36)
MCV RBC AUTO: 82.8 FL (ref 80–100)
MONOCYTES # BLD: 0.9 K/UL (ref 0–1.3)
MONOCYTES NFR BLD: 6.8 %
NEUTROPHILS # BLD: 8.4 K/UL (ref 1.7–7.7)
NEUTROPHILS NFR BLD: 64.4 %
PERFORMED ON: ABNORMAL
PLATELET # BLD AUTO: 289 K/UL (ref 135–450)
PMV BLD AUTO: 8.7 FL (ref 5–10.5)
POTASSIUM SERPL-SCNC: 4.5 MMOL/L (ref 3.5–5.1)
PROT SERPL-MCNC: 8.3 G/DL (ref 6.4–8.2)
PROTHROMBIN TIME: 12.8 SEC (ref 11.9–14.9)
RBC # BLD AUTO: 5.13 M/UL (ref 4.2–5.9)
SODIUM SERPL-SCNC: 135 MMOL/L (ref 136–145)
TROPONIN, HIGH SENSITIVITY: 7 NG/L (ref 0–22)
WBC # BLD AUTO: 13 K/UL (ref 4–11)

## 2025-01-11 PROCEDURE — 99285 EMERGENCY DEPT VISIT HI MDM: CPT

## 2025-01-11 PROCEDURE — 6360000004 HC RX CONTRAST MEDICATION: Performed by: STUDENT IN AN ORGANIZED HEALTH CARE EDUCATION/TRAINING PROGRAM

## 2025-01-11 PROCEDURE — 93010 ELECTROCARDIOGRAM REPORT: CPT | Performed by: INTERNAL MEDICINE

## 2025-01-11 PROCEDURE — 70450 CT HEAD/BRAIN W/O DYE: CPT

## 2025-01-11 PROCEDURE — 85025 COMPLETE CBC W/AUTO DIFF WBC: CPT

## 2025-01-11 PROCEDURE — 2500000003 HC RX 250 WO HCPCS: Performed by: STUDENT IN AN ORGANIZED HEALTH CARE EDUCATION/TRAINING PROGRAM

## 2025-01-11 PROCEDURE — 93005 ELECTROCARDIOGRAM TRACING: CPT | Performed by: STUDENT IN AN ORGANIZED HEALTH CARE EDUCATION/TRAINING PROGRAM

## 2025-01-11 PROCEDURE — 36415 COLL VENOUS BLD VENIPUNCTURE: CPT

## 2025-01-11 PROCEDURE — 96372 THER/PROPH/DIAG INJ SC/IM: CPT

## 2025-01-11 PROCEDURE — 99284 EMERGENCY DEPT VISIT MOD MDM: CPT

## 2025-01-11 PROCEDURE — 84484 ASSAY OF TROPONIN QUANT: CPT

## 2025-01-11 PROCEDURE — 70498 CT ANGIOGRAPHY NECK: CPT

## 2025-01-11 PROCEDURE — 6360000002 HC RX W HCPCS: Performed by: STUDENT IN AN ORGANIZED HEALTH CARE EDUCATION/TRAINING PROGRAM

## 2025-01-11 PROCEDURE — 85610 PROTHROMBIN TIME: CPT

## 2025-01-11 PROCEDURE — 80053 COMPREHEN METABOLIC PANEL: CPT

## 2025-01-11 RX ORDER — IOPAMIDOL 755 MG/ML
75 INJECTION, SOLUTION INTRAVASCULAR
Status: COMPLETED | OUTPATIENT
Start: 2025-01-11 | End: 2025-01-11

## 2025-01-11 RX ORDER — ATORVASTATIN CALCIUM 40 MG/1
80 TABLET, FILM COATED ORAL NIGHTLY
Status: CANCELLED | OUTPATIENT
Start: 2025-01-11

## 2025-01-11 RX ORDER — SODIUM CHLORIDE 0.9 % (FLUSH) 0.9 %
5-40 SYRINGE (ML) INJECTION EVERY 12 HOURS SCHEDULED
Status: CANCELLED | OUTPATIENT
Start: 2025-01-11

## 2025-01-11 RX ORDER — ASPIRIN 81 MG/1
81 TABLET, CHEWABLE ORAL DAILY
Status: CANCELLED | OUTPATIENT
Start: 2025-01-11

## 2025-01-11 RX ORDER — SODIUM CHLORIDE 0.9 % (FLUSH) 0.9 %
5-40 SYRINGE (ML) INJECTION PRN
Status: CANCELLED | OUTPATIENT
Start: 2025-01-11

## 2025-01-11 RX ORDER — POLYETHYLENE GLYCOL 3350 17 G/17G
17 POWDER, FOR SOLUTION ORAL DAILY PRN
Status: CANCELLED | OUTPATIENT
Start: 2025-01-11

## 2025-01-11 RX ORDER — SODIUM CHLORIDE 9 MG/ML
INJECTION, SOLUTION INTRAVENOUS PRN
Status: CANCELLED | OUTPATIENT
Start: 2025-01-11

## 2025-01-11 RX ORDER — ONDANSETRON 4 MG/1
4 TABLET, ORALLY DISINTEGRATING ORAL EVERY 8 HOURS PRN
Status: CANCELLED | OUTPATIENT
Start: 2025-01-11

## 2025-01-11 RX ORDER — ACYCLOVIR 200 MG/5ML
400 SUSPENSION ORAL ONCE
Status: DISCONTINUED | OUTPATIENT
Start: 2025-01-11 | End: 2025-01-11 | Stop reason: HOSPADM

## 2025-01-11 RX ORDER — ONDANSETRON 2 MG/ML
4 INJECTION INTRAMUSCULAR; INTRAVENOUS EVERY 6 HOURS PRN
Status: CANCELLED | OUTPATIENT
Start: 2025-01-11

## 2025-01-11 RX ORDER — ASPIRIN 300 MG/1
300 SUPPOSITORY RECTAL DAILY
Status: CANCELLED | OUTPATIENT
Start: 2025-01-11

## 2025-01-11 RX ORDER — SERTRALINE HYDROCHLORIDE 100 MG/1
100 TABLET, FILM COATED ORAL DAILY
Status: CANCELLED | OUTPATIENT
Start: 2025-01-11

## 2025-01-11 RX ORDER — ACYCLOVIR 200 MG/5ML
400 SUSPENSION ORAL
Qty: 350 ML | Refills: 0 | Status: SHIPPED | OUTPATIENT
Start: 2025-01-11 | End: 2025-01-18

## 2025-01-11 RX ORDER — PREDNISONE 5 MG/ML
40 SOLUTION ORAL DAILY
Qty: 280 ML | Refills: 0 | Status: SHIPPED | OUTPATIENT
Start: 2025-01-11 | End: 2025-01-18

## 2025-01-11 RX ADMIN — WATER 60 MG: 1 INJECTION INTRAMUSCULAR; INTRAVENOUS; SUBCUTANEOUS at 18:04

## 2025-01-11 RX ADMIN — IOPAMIDOL 75 ML: 755 INJECTION, SOLUTION INTRAVENOUS at 06:11

## 2025-01-11 ASSESSMENT — LIFESTYLE VARIABLES
HOW MANY STANDARD DRINKS CONTAINING ALCOHOL DO YOU HAVE ON A TYPICAL DAY: PATIENT DOES NOT DRINK
HOW OFTEN DO YOU HAVE A DRINK CONTAINING ALCOHOL: NEVER

## 2025-01-11 ASSESSMENT — PAIN - FUNCTIONAL ASSESSMENT
PAIN_FUNCTIONAL_ASSESSMENT: NONE - DENIES PAIN
PAIN_FUNCTIONAL_ASSESSMENT: NONE - DENIES PAIN

## 2025-01-11 NOTE — ED PROVIDER NOTES
I was not involved in the care of this patient, see Dr. Sanders's note for details of his evaluation and plan.  I was called to bedside after patient was planned for admission for concern of stroke versus TIA symptoms because the patient wished to leave AGAINST MEDICAL ADVICE.  I discussed with the patient that because of these concerns and inability at this facility to fully evaluate for stroke including MRI as well as need for risk-management and medication management leaving prior to completion of his evaluation meant that we could not rule out potential stroke or risk for future stroke.  This puts the patient at risk for disability, paralyzation as well as death.  Patient voiced understanding of these risks and our concerns and recommendations for admission with appropriate completion of his evaluation however stated that his significant other requires medication and needed to go home.  Patient acknowledged risks and stated that he would leave AGAINST MEDICAL ADVICE despite these with plan for call to his PCP for close follow-up.     Bassam Murguia MD  01/11/25 5191

## 2025-01-11 NOTE — ED PROVIDER NOTES
Emergency Department Encounter    Patient: Girish Mendoza  MRN: [id number]  : 1995  Date of Evaluation: 2025  ED Provider:  Parish Sanders MD    Triage Chief Complaint:   No chief complaint on file.    [place]:  Girish Mendoza is a 29 y.o. male with history seen below presenting with complaints of right-sided facial droop and numbness that started at 5 PM last night over 12 hours prior to presentation.  He denies any other motor or sensory changes.  Denies slurred speech, double vision or blurred vision.  Denies headache.  Denies recent fall or trauma.  Denies history of Bell's palsy in the past.  Denies chest pain, shortness of breath, Lancer dizziness, neck or back pain, abdominal pain, nausea vomiting, diarrhea constipation or urinary symptoms.  Denies anticoagulation or antiplatelets.    ROS - see HPI, below listed is current ROS at time of my eval:  At least 14 systems reviewed, negative other than HPI  Past Medical History:   Diagnosis Date    Anxiety     Depression     Dizziness      No past surgical history on file.  No family history on file.  Social History     Socioeconomic History    Marital status: Single     Spouse name: Not on file    Number of children: Not on file    Years of education: Not on file    Highest education level: Not on file   Occupational History    Not on file   Tobacco Use    Smoking status: Former     Current packs/day: 0.25     Average packs/day: 0.3 packs/day for 3.0 years (0.8 ttl pk-yrs)     Types: Cigarettes    Smokeless tobacco: Current     Types: Snuff   Vaping Use    Vaping status: Every Day   Substance and Sexual Activity    Alcohol use: No    Drug use: No    Sexual activity: Yes     Partners: Female   Other Topics Concern    Not on file   Social History Narrative    Not on file     Social Determinants of Health     Financial Resource Strain: Not on file   Food Insecurity: Not on file   Transportation Needs: Not on file   Physical Activity: Not on file  discussion with patient will admit patient for MRI to rule out CVA.  Hospitalist was called and patient admitted their service for further evaluation treatment.    Clinical Impression:  1. Facial droop                Comment: Please note this report has been produced using speech recognition software and may contain errors related to that system including errors in grammar, punctuation, and spelling, as well as words and phrases that may be inappropriate.  Efforts were made to edit the dictations.        Parish Sanders MD  01/13/25 1200

## 2025-01-11 NOTE — PLAN OF CARE
Right sided facial droop and numbness and right side of face > 12 hours prior to presentation. CT and CTA head/neck not acute. Stroke team recommends MRI.     2W

## 2025-01-11 NOTE — ED PROVIDER NOTES
[facility] EMERGENCY DEPARTMENT     EMERGENCY DEPARTMENT ENCOUNTER         Pt Name: Girish Mendoza   MRN: [id number]   Birthdate 1995   Date of evaluation: 1/11/2025   Provider: Jay Bill MD   PCP: Toan Martinez APRN - CNP   Note Started: 5:56 PM EST 1/11/25       Chief Complaint     Numbness (Pt reports right sided facial numbness, pt left MTO this am AMA after evaluation and need to be transfered)      History of Present Illness     Girish Mendoza is a 29 y.o. male who presents with right-sided facial weakness and paresthesias or numbness of the tongue.  The patient has a history of anxiety has also been vaping marijuana and had attributed his tongue tingling to marijuana use.  He has generally been in baseline health he did have a presumably viral URI recently seem to make a full recovery.  He states that last night around 5 PM he developed some tingling of his tongue which persisted through the night and this morning around 5 AM he noticed some weakness of the right side of his face and therefore presented to another emergency department where his initial presentation raise concern for a stroke syndrome and he was in fact accepted for admission for the benefit of MRI imaging and further characterization however the patient was unwilling to await this disposition and discharged AGAINST MEDICAL ADVICE.  The patient returned home he went back to bed and awoke now this evening with persistent symptoms and some difficulty drinking or eating due to weakness of the right corner of the mouth and given this he returns to the emergency department for evaluation.      Review of Systems     Positives and pertinent negatives as per HPI.    Past Medical, Surgical, Family, and Social History     He has a past medical history of Anxiety, Depression, and Dizziness.  He has no past surgical history on file.  His family history is not on file.  He reports that he has quit smoking. His smoking use

## 2025-01-11 NOTE — DISCHARGE INSTRUCTIONS
You were evaluated in the emergency department for facial weakness. Assessments and testing completed during your visit were reassuring and at this time there is no indication for further testing, treatment or admission to the hospital. Given this it is appropriate to discharge you from the emergency department. At the time of discharge we discussed the following:    At this time I believe you have Bell's palsy please reference the enclosed information please use the steroid and antiviral medication and I expect your condition to improve however you should follow-up to the ear nose and throat specialist in 1 week for reevaluation and further guidance.  Please return with any new or worsening symptoms.  As we discussed also please use lubricating eyedrops during the day to protect your right eye and if you are having difficulty closing your eye at night I would recommend using tape to keep your eyes closed so that it does not become dry and injured    Please note that sometimes it is difficult to diagnose a medical condition early in the disease process before the disease is fully manifest. Because of this, should you develop any new or worsening symptoms, you may return at any time to the emergency department for another evaluation. If available you are also recommended to review this visit with your primary care physician or other medical provider in the next 7 days. Thank you for allowing us to care for you today.

## 2025-01-11 NOTE — DISCHARGE INSTRUCTIONS
As discussed in the ED, you are leaving against my advice prior to completion of your evaluation for stroke, and by doing so are putting yourself at risk for the complications of undiagnosed and/or untreated stroke symptoms which include but are not limited to further propagation of stroke or another stroke, death and permanent disability. Please reconsider. If you insist on leaving, you can return to the ED at any time if you change your mind about having your evaluation and treatment completed, and should return immediately if there is any change or worsening of your symptoms. Call your primary doctor as soon as you get home to arrange for a follow up appointment as soon as possible.